# Patient Record
Sex: MALE | Race: WHITE | NOT HISPANIC OR LATINO | Employment: UNEMPLOYED | URBAN - NONMETROPOLITAN AREA
[De-identification: names, ages, dates, MRNs, and addresses within clinical notes are randomized per-mention and may not be internally consistent; named-entity substitution may affect disease eponyms.]

---

## 2017-04-20 ENCOUNTER — LAB REQUISITION (OUTPATIENT)
Dept: LAB | Facility: HOSPITAL | Age: 62
End: 2017-04-20

## 2017-04-20 DIAGNOSIS — M25.561 PAIN IN RIGHT KNEE: ICD-10-CM

## 2017-04-20 LAB
APPEARANCE FLD: ABNORMAL
COLOR FLD: YELLOW
CRYSTALS FLD MICRO: NORMAL
GRAM STN SPEC: NORMAL
RBC # FLD AUTO: 1000 /MM3 (ref 0–200000)
WBC # FLD: 4018 /MM3 (ref 0–1000)

## 2017-04-20 PROCEDURE — 87015 SPECIMEN INFECT AGNT CONCNTJ: CPT | Performed by: EMERGENCY MEDICINE

## 2017-04-20 PROCEDURE — 87205 SMEAR GRAM STAIN: CPT | Performed by: EMERGENCY MEDICINE

## 2017-04-20 PROCEDURE — 89051 BODY FLUID CELL COUNT: CPT | Performed by: EMERGENCY MEDICINE

## 2017-04-20 PROCEDURE — 89060 EXAM SYNOVIAL FLUID CRYSTALS: CPT | Performed by: EMERGENCY MEDICINE

## 2017-04-20 PROCEDURE — 87070 CULTURE OTHR SPECIMN AEROBIC: CPT | Performed by: EMERGENCY MEDICINE

## 2017-04-20 PROCEDURE — 85060 BLOOD SMEAR INTERPRETATION: CPT | Performed by: EMERGENCY MEDICINE

## 2017-04-24 LAB
BACTERIA FLD CULT: NO GROWTH
INTERPRETATION: NORMAL
MONOCYTES NFR FLD: 6 %
NEUTROPHILS NFR FLD MANUAL: 94 %

## 2017-06-06 ENCOUNTER — OFFICE VISIT (OUTPATIENT)
Dept: NEUROSURGERY | Facility: CLINIC | Age: 62
End: 2017-06-06

## 2017-06-06 VITALS — BODY MASS INDEX: 33.6 KG/M2 | WEIGHT: 240 LBS | HEIGHT: 71 IN

## 2017-06-06 DIAGNOSIS — M48.02 FORAMINAL STENOSIS OF CERVICAL REGION: Primary | ICD-10-CM

## 2017-06-06 PROCEDURE — 99243 OFF/OP CNSLTJ NEW/EST LOW 30: CPT | Performed by: NEUROLOGICAL SURGERY

## 2017-06-06 RX ORDER — BUDESONIDE AND FORMOTEROL FUMARATE DIHYDRATE 160; 4.5 UG/1; UG/1
2 AEROSOL RESPIRATORY (INHALATION)
COMMUNITY

## 2017-06-06 RX ORDER — ALBUTEROL SULFATE 90 UG/1
2 AEROSOL, METERED RESPIRATORY (INHALATION) EVERY 4 HOURS PRN
COMMUNITY
End: 2018-05-09

## 2017-06-06 RX ORDER — HYDROCODONE BITARTRATE AND ACETAMINOPHEN 10; 325 MG/1; MG/1
1 TABLET ORAL 3 TIMES DAILY
Refills: 0 | Status: ON HOLD | COMMUNITY
Start: 2017-05-11 | End: 2018-05-17

## 2017-06-06 NOTE — PROGRESS NOTES
Subjective   Patient ID: Luis Crenshaw is a 62 y.o. 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  His problem is primarily weakness in his upper extremities.  He states he lost right triceps strength about 2 years ago but it has partially returned.  He has lost the strength to elevate his left arm in the past year.  He has pain in his neck.  Everything seems to aggravate his problem.  He has been disabled over the past 5 years.    Review of Radiographic Studies:  Cervical MRI scan on 5/8/17 shows a right C6 7 foraminal stenosis, bilateral C5 6 foraminal stenosis and moderate central canal stenosis, and possible left C4 5 foraminal stenosis.    The following portions of the patient's history were reviewed, updated as appropriate and approved: allergies, current medications, past family history, past medical history, past social history, past surgical history, review of systems and problem list.  Review of Systems   Constitutional: Negative for activity change, appetite change, chills, diaphoresis, fatigue, fever and unexpected weight change.   HENT: Negative for congestion, dental problem, drooling, ear discharge, ear pain, facial swelling, hearing loss, mouth sores, nosebleeds, postnasal drip, rhinorrhea, sinus pressure, sneezing, sore throat, tinnitus, trouble swallowing and voice change.    Eyes: Negative for photophobia, pain, discharge, redness, itching and visual disturbance.   Respiratory: Negative for apnea, cough, choking, chest tightness, shortness of breath, wheezing and stridor.    Cardiovascular: Negative for chest pain, palpitations and leg swelling.   Gastrointestinal: Negative for abdominal distention, abdominal pain, anal bleeding, blood in stool, constipation, diarrhea, nausea, rectal pain and vomiting.    Endocrine: Negative for cold intolerance, heat intolerance, polydipsia, polyphagia and polyuria.   Genitourinary: Negative for decreased urine volume, difficulty urinating, dysuria, enuresis, flank pain, frequency, genital sores, hematuria and urgency.   Musculoskeletal: Positive for arthralgias, back pain and neck stiffness. Negative for gait problem, joint swelling, myalgias and neck pain.   Skin: Negative for color change, pallor, rash and wound.   Allergic/Immunologic: Negative for environmental allergies, food allergies and immunocompromised state.   Neurological: Positive for weakness. Negative for dizziness, tremors, seizures, syncope, facial asymmetry, speech difficulty, light-headedness, numbness and headaches.   Hematological: Negative for adenopathy. Does not bruise/bleed easily.   Psychiatric/Behavioral: Negative for agitation, behavioral problems, confusion, decreased concentration, dysphoric mood, hallucinations, self-injury, sleep disturbance and suicidal ideas. The patient is not nervous/anxious and is not hyperactive.        Objective     NEUROLOGICAL EXAMINATION:      MENTAL STATUS:  Alert and oriented.  Speech intact.  Recent and remote memory intact.      CRANIAL NERVES:  Cranial nerve II:  Visual fields are full to confrontation.  Cranial nerves III, IV and VI:  PERRLADC.  Extraocular movements are intact.  Nystagmus is not present.  Cranial nerve V:  Facial sensation is intact to light touch.  Cranial nerve VII:  Muscles of facial expression reveal no asymmetry.  Cranial nerve VIII:  Hearing is intact to finger rub bilaterally.  Cranial nerves IX and X:  Palate elevates symmetrically.  Cranial nerve XI:  Shoulder shrug is intact.  Cranial nerve XII:  Tongue is midline without evidence of atrophy or fasciculation.    MUSCULOSKELETAL:  No cervical muscle spasm.    MOTOR:  Marked deltoid weakness on the left and mild biceps weakness on the left.  Mild residual triceps weakness on the  right.    SENSATION: Intact to touch upper and lower extremities.  Position sense intact.    REFLEXES:  DTR intact right biceps, absent left biceps, absent triceps bilaterally.    Assessment   Bilateral cervical foraminal stenosis C5 6, right C6 7 cervical foraminal stenosis, possible left C4 5 cervical foraminal stenosis.       Plan   Surgical decompression of his a reasonable choice.  We need to better define the symptomatic level on the left side, whether it is C4 5 left are C5 6 left or both.  Therefore cervical myelogram and upper extremity EMG/NCV will be done and I'll see him in follow-up.        Nithin Cortes MD

## 2017-06-09 DIAGNOSIS — M48.02 FORAMINAL STENOSIS OF CERVICAL REGION: Primary | ICD-10-CM

## 2017-06-15 ENCOUNTER — HOSPITAL ENCOUNTER (OUTPATIENT)
Facility: HOSPITAL | Age: 62
Setting detail: HOSPITAL OUTPATIENT SURGERY
End: 2017-06-15
Attending: RADIOLOGY | Admitting: RADIOLOGY

## 2017-06-15 DIAGNOSIS — M48.02 FORAMINAL STENOSIS OF CERVICAL REGION: ICD-10-CM

## 2017-06-19 ENCOUNTER — PREP FOR SURGERY (OUTPATIENT)
Dept: OTHER | Facility: HOSPITAL | Age: 62
End: 2017-06-19

## 2017-06-19 RX ORDER — PHENYLEPHRINE HYDROCHLORIDE 100 MG/ML
1 SOLUTION/ DROPS OPHTHALMIC
Status: CANCELLED | OUTPATIENT
Start: 2017-06-19 | End: 2017-06-19

## 2017-06-19 RX ORDER — PREDNISOLONE ACETATE 10 MG/ML
1 SUSPENSION/ DROPS OPHTHALMIC 4 TIMES DAILY
Status: CANCELLED | OUTPATIENT
Start: 2017-06-19

## 2017-06-19 RX ORDER — CYCLOPENTOLATE HYDROCHLORIDE 20 MG/ML
1 SOLUTION/ DROPS OPHTHALMIC
Status: CANCELLED | OUTPATIENT
Start: 2017-06-19 | End: 2017-06-19

## 2017-07-06 ENCOUNTER — ANESTHESIA (OUTPATIENT)
Dept: PERIOP | Facility: HOSPITAL | Age: 62
End: 2017-07-06

## 2017-07-06 ENCOUNTER — ANESTHESIA EVENT (OUTPATIENT)
Dept: PERIOP | Facility: HOSPITAL | Age: 62
End: 2017-07-06

## 2017-07-06 ENCOUNTER — HOSPITAL ENCOUNTER (OUTPATIENT)
Facility: HOSPITAL | Age: 62
Setting detail: HOSPITAL OUTPATIENT SURGERY
Discharge: HOME | End: 2017-07-06
Attending: OPHTHALMOLOGY | Admitting: OPHTHALMOLOGY

## 2017-07-06 VITALS
HEIGHT: 71 IN | SYSTOLIC BLOOD PRESSURE: 149 MMHG | TEMPERATURE: 98.5 F | OXYGEN SATURATION: 97 % | DIASTOLIC BLOOD PRESSURE: 104 MMHG | WEIGHT: 240 LBS | BODY MASS INDEX: 33.6 KG/M2 | HEART RATE: 64 BPM | RESPIRATION RATE: 18 BRPM

## 2017-07-06 PROCEDURE — 25010000002 EPINEPHRINE PER 0.1 MG: Performed by: OPHTHALMOLOGY

## 2017-07-06 PROCEDURE — V2632 POST CHMBR INTRAOCULAR LENS: HCPCS | Performed by: OPHTHALMOLOGY

## 2017-07-06 PROCEDURE — 25010000002 EPINEPHRINE 1 MG/ML SOLUTION: Performed by: OPHTHALMOLOGY

## 2017-07-06 PROCEDURE — 25010000002 MIDAZOLAM PER 1 MG: Performed by: NURSE ANESTHETIST, CERTIFIED REGISTERED

## 2017-07-06 DEVICE — LENS ACRYSOF IQ 6X13MM SN60WF 20.0: Type: IMPLANTABLE DEVICE | Site: POSTERIOR CHAMBER | Status: FUNCTIONAL

## 2017-07-06 RX ORDER — PREDNISOLONE ACETATE 10 MG/ML
SUSPENSION/ DROPS OPHTHALMIC
Status: DISCONTINUED
Start: 2017-07-06 | End: 2017-07-06 | Stop reason: HOSPADM

## 2017-07-06 RX ORDER — LIDOCAINE HYDROCHLORIDE 40 MG/ML
SOLUTION TOPICAL AS NEEDED
Status: DISCONTINUED | OUTPATIENT
Start: 2017-07-06 | End: 2017-07-06 | Stop reason: HOSPADM

## 2017-07-06 RX ORDER — PREDNISOLONE ACETATE 10 MG/ML
1 SUSPENSION/ DROPS OPHTHALMIC 4 TIMES DAILY
Status: DISCONTINUED | OUTPATIENT
Start: 2017-07-06 | End: 2017-07-06 | Stop reason: HOSPADM

## 2017-07-06 RX ORDER — PHENYLEPHRINE HYDROCHLORIDE 100 MG/ML
1 SOLUTION/ DROPS OPHTHALMIC
Status: COMPLETED | OUTPATIENT
Start: 2017-07-06 | End: 2017-07-06

## 2017-07-06 RX ORDER — EPINEPHRINE 1 MG/ML
INJECTION INTRAMUSCULAR; INTRAVENOUS; SUBCUTANEOUS AS NEEDED
Status: DISCONTINUED | OUTPATIENT
Start: 2017-07-06 | End: 2017-07-06 | Stop reason: HOSPADM

## 2017-07-06 RX ORDER — TETRACAINE HYDROCHLORIDE 5 MG/ML
SOLUTION OPHTHALMIC AS NEEDED
Status: DISCONTINUED | OUTPATIENT
Start: 2017-07-06 | End: 2017-07-06 | Stop reason: HOSPADM

## 2017-07-06 RX ORDER — SODIUM CHLORIDE 0.9 % (FLUSH) 0.9 %
3 SYRINGE (ML) INJECTION AS NEEDED
Status: DISCONTINUED | OUTPATIENT
Start: 2017-07-06 | End: 2017-07-06 | Stop reason: HOSPADM

## 2017-07-06 RX ORDER — CYCLOPENTOLATE HYDROCHLORIDE 20 MG/ML
1 SOLUTION/ DROPS OPHTHALMIC
Status: COMPLETED | OUTPATIENT
Start: 2017-07-06 | End: 2017-07-06

## 2017-07-06 RX ORDER — MIDAZOLAM HYDROCHLORIDE 1 MG/ML
INJECTION INTRAMUSCULAR; INTRAVENOUS AS NEEDED
Status: DISCONTINUED | OUTPATIENT
Start: 2017-07-06 | End: 2017-07-06 | Stop reason: SURG

## 2017-07-06 RX ORDER — PHENYLEPHRINE HYDROCHLORIDE 100 MG/ML
SOLUTION/ DROPS OPHTHALMIC
Status: COMPLETED
Start: 2017-07-06 | End: 2017-07-06

## 2017-07-06 RX ORDER — BALANCED SALT SOLUTION 6.4; .75; .48; .3; 3.9; 1.7 MG/ML; MG/ML; MG/ML; MG/ML; MG/ML; MG/ML
SOLUTION OPHTHALMIC AS NEEDED
Status: DISCONTINUED | OUTPATIENT
Start: 2017-07-06 | End: 2017-07-06 | Stop reason: HOSPADM

## 2017-07-06 RX ORDER — POLYMYXIN B SULFATE AND TRIMETHOPRIM 1; 10000 MG/ML; [USP'U]/ML
SOLUTION OPHTHALMIC AS NEEDED
Status: DISCONTINUED | OUTPATIENT
Start: 2017-07-06 | End: 2017-07-06 | Stop reason: HOSPADM

## 2017-07-06 RX ORDER — CYCLOPENTOLATE HYDROCHLORIDE 20 MG/ML
SOLUTION/ DROPS OPHTHALMIC
Status: COMPLETED
Start: 2017-07-06 | End: 2017-07-06

## 2017-07-06 RX ORDER — SODIUM CHLORIDE, SODIUM LACTATE, POTASSIUM CHLORIDE, CALCIUM CHLORIDE 600; 310; 30; 20 MG/100ML; MG/100ML; MG/100ML; MG/100ML
1000 INJECTION, SOLUTION INTRAVENOUS CONTINUOUS PRN
Status: DISCONTINUED | OUTPATIENT
Start: 2017-07-06 | End: 2017-07-06 | Stop reason: HOSPADM

## 2017-07-06 RX ADMIN — CYCLOPENTOLATE HYDROCHLORIDE 1 DROP: 20 SOLUTION/ DROPS OPHTHALMIC at 07:50

## 2017-07-06 RX ADMIN — CYCLOPENTOLATE HYDROCHLORIDE 1 DROP: 20 SOLUTION/ DROPS OPHTHALMIC at 07:45

## 2017-07-06 RX ADMIN — CYCLOPENTOLATE HYDROCHLORIDE 1 DROP: 20 SOLUTION/ DROPS OPHTHALMIC at 07:40

## 2017-07-06 RX ADMIN — SODIUM CHLORIDE, POTASSIUM CHLORIDE, SODIUM LACTATE AND CALCIUM CHLORIDE 1000 ML: 600; 310; 30; 20 INJECTION, SOLUTION INTRAVENOUS at 07:47

## 2017-07-06 RX ADMIN — PHENYLEPHRINE HYDROCHLORIDE 1 DROP: 100 SOLUTION/ DROPS OPHTHALMIC at 07:50

## 2017-07-06 RX ADMIN — PHENYLEPHRINE HYDROCHLORIDE 1 DROP: 100 SOLUTION/ DROPS OPHTHALMIC at 07:45

## 2017-07-06 RX ADMIN — MIDAZOLAM HYDROCHLORIDE 0.5 MG: 1 INJECTION, SOLUTION INTRAMUSCULAR; INTRAVENOUS at 09:20

## 2017-07-06 RX ADMIN — PHENYLEPHRINE HYDROCHLORIDE 1 DROP: 100 SOLUTION/ DROPS OPHTHALMIC at 07:40

## 2017-07-06 NOTE — ANESTHESIA POSTPROCEDURE EVALUATION
Patient: Luis Crenshaw    Procedure Summary     Date Anesthesia Start Anesthesia Stop Room / Location    07/06/17 0919 0944  [facility]       Procedure Diagnosis Surgeon Provider    CATARACT PHACO EXTRACTION WITH INTRAOCULAR LENS IMPLANT LEFT (Left Eye) No diagnosis on file. MD Cheikh Alegre CRNA          Anesthesia Type: MAC  Last vitals  BP      Temp      Pulse     Resp      SpO2        Post Anesthesia Care and Evaluation    Patient location during evaluation: bedside  Patient participation: complete - patient participated  Level of consciousness: awake and alert  Pain management: satisfactory to patient  Airway patency: patent  Anesthetic complications: No anesthetic complications  PONV Status: controlled  Cardiovascular status: acceptable and stable  Respiratory status: acceptable  Hydration status: acceptable

## 2017-07-06 NOTE — NURSING NOTE
Pt unable to remove wedding band, informed him and his daughter of risk r/t metal on body during procedure, verbalized understanding of risk, ring was taped

## 2017-07-06 NOTE — H&P
[id number]  Luis Crenshaw  male  1955  Anton Campa MD  7/6/2017  PATIENT NAME: Luis Crenshaw    [facility]  PREOPERATIVE PHYSICAL EXAMINATION    Temp:  [98.3 °F (36.8 °C)] 98.3 °F (36.8 °C)  Heart Rate:  [64] 64  Resp:  [19] 19  BP: (166)/(97) 166/97    Past Medical History:   Diagnosis Date   • Arthritis    • Cataract, bilateral    • DDD (degenerative disc disease), lumbar        Past Surgical History:   Procedure Laterality Date   • COLONOSCOPY     • FEMUR FRACTURE SURGERY Right 1976    [place], [place]       Social History     Social History   • Marital status:      Spouse name: N/A   • Number of children: N/A   • Years of education: N/A     Occupational History   • Not on file.     Social History Main Topics   • Smoking status: Current Every Day Smoker     Packs/day: 2.00     Years: 40.00     Types: Cigarettes   • Smokeless tobacco: Never Used   • Alcohol use 14.4 oz/week     24 Cans of beer per week   • Drug use: No   • Sexual activity: Defer     Other Topics Concern   • Not on file     Social History Narrative       History reviewed. No pertinent family history.    Prescriptions Prior to Admission   Medication Sig Dispense Refill Last Dose   • albuterol (PROVENTIL HFA;VENTOLIN HFA) 108 (90 BASE) MCG/ACT inhaler Inhale 2 puffs Every 4 (Four) Hours As Needed for Wheezing.   Past Week at Unknown time   • budesonide-formoterol (SYMBICORT) 160-4.5 MCG/ACT inhaler Inhale 2 puffs 2 (Two) Times a Day.   Past Week at Unknown time   • HYDROcodone-acetaminophen (NORCO)  MG per tablet Take 1 tablet by mouth 3 (Three) Times a Day.  0 7/5/2017 at 1800        Within Normal Limits Abnormal   Mental Status [x]    []     Head, Neck, and Throat [x]   []     Chest/Lungs [x]   []     Cardiovascular [x]   []     Abdomen [x]   []     Extremities [x]   []     Neurologic [x]   []     Other       Diagnosis: Cataract      STATEMENT AS TO REASON OF PLANNED OPERATION:  [x]   H&P revealed no contraindication to planned  surgery. (Check if correct).    [x]   Labs (if ordered) have been reviewed and revealed no contraindications to planned surgery. (Check if correct).    [x]   Patient has been advised to see his local medical doctor/family doctor for follow-up regarding systemic care and/or abnormal labs.  (Check if correct).    Anton Campa MD  7/6/2017

## 2017-07-06 NOTE — OP NOTE
"Patient Name: Luis Crenshaw  Patient Number: [id number]    PRE-OPERATIVE DIAGNOSIS:  Cataract []  OD, [x]  OS  H25.1()    POST-OPERATIVE DIAGNOSIS:  Same    PROCEDURE:     CATARACT PHACO EXTRACTION WITH INTRAOCULAR LENS IMPLANT LEFT (Left)    Surgeon:      Anton Campa MD    Date of Operation:    7/6/2017    ANESTHESIA:   MAC  COMPLICATIONS:    None  SPECIMEN REMOVED:  Cataract  ESTIMATED BLOOD LOSS:  None    After identifying the patient and obtained fully-informed consent, preoperative drops placed as ordered.  Pre-operative \"time out\" performed.  Patient brought into the operating room and positioned.  Cardiac monitoring was instituted.  Anesthetic gtt to operative eye.      After a surgical scrub, the patient was prepped and draped in the standard ophthalmic fashion.  Lid speculum. Paracentesis. Viscoelastic. Keratome tunneled into anterior chamber.  Capsulorrhexis. Hydrodissection.    Phaco machine tested and found to be in good working order.  Two-handed phacoemulsification performed.     I/A to remove residual cortex.  Viscoelastic in capsular bag.  Intraocular lens placed in standard fashion and centered.  I/A to remove viscoelastic.  Wound hydrated, tested, and found to be watertight.      Lid speculum and drapes removed.  Antibiotic gtt. Eye shield.  Patient to recovery area.  Verbal and written discharge instructions given.  Follow-up appointment given.    Anton Campa MD      Immediate Post-Op Note  Date: 7/6/2017 9:45 AM      "

## 2017-07-06 NOTE — ANESTHESIA PREPROCEDURE EVALUATION
Anesthesia Evaluation     Patient summary reviewed and Nursing notes reviewed   no history of anesthetic complications:  NPO Solid Status: > 8 hours  NPO Liquid Status: > 8 hours     Airway   Mallampati: II  TM distance: >3 FB  Neck ROM: full  no difficulty expected  Dental - normal exam     Pulmonary - normal exam    breath sounds clear to auscultation  (+) COPD mild,   Cardiovascular - negative cardio ROS and normal exam  Exercise tolerance: good (4-7 METS)    Rhythm: regular  Rate: normal        Neuro/Psych- negative ROS  GI/Hepatic/Renal/Endo - negative ROS     Musculoskeletal     Abdominal    Substance History - negative use     OB/GYN negative ob/gyn ROS         Other   (+) arthritis                                     Anesthesia Plan    ASA 3     MAC     Anesthetic plan and risks discussed with patient.

## 2017-07-06 NOTE — PLAN OF CARE
Problem: Cataract (Adult)  Goal: Signs and Symptoms of Listed Potential Problems Will be Absent or Manageable (Cataract)  Outcome: Ongoing (interventions implemented as appropriate)    07/06/17 8799   Cataract   Problems Assessed (Cataract) all   Problems Present (Cataract) none

## 2017-07-06 NOTE — DISCHARGE INSTRUCTIONS
To assist you in voiding:  Drink plenty of fluids  Listen to running water while attempting to void.    If you are unable to urinate and you have an uncomfortable urge to void or it has been   6 hours since you were discharged, return to the Emergency Room    FOLLOW THE POST OP INSTRUCTIONS AND APPOINTMENT TIME FROM DR CARTAGENA OFFICE INCLUDED IN THIS PACKET.

## 2017-07-21 ENCOUNTER — HOSPITAL ENCOUNTER (OUTPATIENT)
Facility: HOSPITAL | Age: 62
Setting detail: HOSPITAL OUTPATIENT SURGERY
End: 2017-07-21
Attending: OPHTHALMOLOGY | Admitting: OPHTHALMOLOGY

## 2017-07-24 VITALS — WEIGHT: 240 LBS | BODY MASS INDEX: 33.6 KG/M2 | HEIGHT: 71 IN

## 2017-07-25 ENCOUNTER — PREP FOR SURGERY (OUTPATIENT)
Dept: OTHER | Facility: HOSPITAL | Age: 62
End: 2017-07-25

## 2017-07-25 DIAGNOSIS — H26.9 CATARACT, RIGHT EYE: Primary | ICD-10-CM

## 2017-07-25 RX ORDER — PREDNISOLONE ACETATE 10 MG/ML
1 SUSPENSION/ DROPS OPHTHALMIC 4 TIMES DAILY
Status: CANCELLED | OUTPATIENT
Start: 2017-07-25

## 2017-07-25 RX ORDER — PHENYLEPHRINE HYDROCHLORIDE 100 MG/ML
1 SOLUTION/ DROPS OPHTHALMIC
Status: CANCELLED | OUTPATIENT
Start: 2017-07-25 | End: 2017-07-25

## 2017-07-25 RX ORDER — POLYMYXIN B SULFATE AND TRIMETHOPRIM 1; 10000 MG/ML; [USP'U]/ML
1 SOLUTION OPHTHALMIC ONCE
Status: CANCELLED | OUTPATIENT
Start: 2017-07-25 | End: 2017-07-25

## 2017-07-25 RX ORDER — TETRACAINE HYDROCHLORIDE 5 MG/ML
2 SOLUTION OPHTHALMIC AS NEEDED
Status: CANCELLED | OUTPATIENT
Start: 2017-07-25

## 2017-07-25 RX ORDER — CYCLOPENTOLATE HYDROCHLORIDE 20 MG/ML
1 SOLUTION/ DROPS OPHTHALMIC
Status: CANCELLED | OUTPATIENT
Start: 2017-07-25 | End: 2017-07-25

## 2017-08-30 ENCOUNTER — HOSPITAL ENCOUNTER (OUTPATIENT)
Facility: HOSPITAL | Age: 62
Setting detail: HOSPITAL OUTPATIENT SURGERY
End: 2017-08-30
Attending: OPHTHALMOLOGY | Admitting: OPHTHALMOLOGY

## 2017-10-02 ENCOUNTER — PREP FOR SURGERY (OUTPATIENT)
Dept: OTHER | Facility: HOSPITAL | Age: 62
End: 2017-10-02

## 2017-10-02 DIAGNOSIS — H26.9 CATARACT, RIGHT: Primary | ICD-10-CM

## 2017-10-02 RX ORDER — LIDOCAINE HYDROCHLORIDE 40 MG/ML
2 INJECTION, SOLUTION RETROBULBAR; TOPICAL
Status: CANCELLED | OUTPATIENT
Start: 2017-10-02

## 2017-10-02 RX ORDER — TETRACAINE HYDROCHLORIDE 5 MG/ML
2 SOLUTION OPHTHALMIC AS NEEDED
Status: CANCELLED | OUTPATIENT
Start: 2017-10-02

## 2017-10-02 RX ORDER — MOXIFLOXACIN 5 MG/ML
1 SOLUTION/ DROPS OPHTHALMIC
Status: CANCELLED | OUTPATIENT
Start: 2017-10-02 | End: 2017-10-02

## 2017-10-02 RX ORDER — CYCLOPENTOLATE HYDROCHLORIDE 20 MG/ML
1 SOLUTION/ DROPS OPHTHALMIC
Status: CANCELLED | OUTPATIENT
Start: 2017-10-02 | End: 2017-10-02

## 2017-10-02 RX ORDER — PREDNISOLONE ACETATE 10 MG/ML
1 SUSPENSION/ DROPS OPHTHALMIC 4 TIMES DAILY
Status: CANCELLED | OUTPATIENT
Start: 2017-10-02

## 2017-10-02 RX ORDER — PHENYLEPHRINE HYDROCHLORIDE 100 MG/ML
1 SOLUTION/ DROPS OPHTHALMIC
Status: CANCELLED | OUTPATIENT
Start: 2017-10-02 | End: 2017-10-02

## 2017-10-05 ENCOUNTER — ANESTHESIA EVENT (OUTPATIENT)
Dept: PERIOP | Facility: HOSPITAL | Age: 62
End: 2017-10-05

## 2017-10-05 ENCOUNTER — ANESTHESIA (OUTPATIENT)
Dept: PERIOP | Facility: HOSPITAL | Age: 62
End: 2017-10-05

## 2017-10-05 ENCOUNTER — HOSPITAL ENCOUNTER (OUTPATIENT)
Facility: HOSPITAL | Age: 62
Setting detail: HOSPITAL OUTPATIENT SURGERY
Discharge: HOME | End: 2017-10-05
Attending: OPHTHALMOLOGY | Admitting: OPHTHALMOLOGY

## 2017-10-05 VITALS
BODY MASS INDEX: 33.6 KG/M2 | DIASTOLIC BLOOD PRESSURE: 96 MMHG | HEIGHT: 71 IN | TEMPERATURE: 99.3 F | OXYGEN SATURATION: 96 % | RESPIRATION RATE: 18 BRPM | SYSTOLIC BLOOD PRESSURE: 162 MMHG | WEIGHT: 240 LBS | HEART RATE: 72 BPM

## 2017-10-05 DIAGNOSIS — H26.9 CATARACT, RIGHT: ICD-10-CM

## 2017-10-05 PROCEDURE — 25010000002 MIDAZOLAM PER 1 MG: Performed by: NURSE ANESTHETIST, CERTIFIED REGISTERED

## 2017-10-05 PROCEDURE — 25010000002 EPINEPHRINE 1 MG/ML SOLUTION: Performed by: OPHTHALMOLOGY

## 2017-10-05 PROCEDURE — 25010000002 EPINEPHRINE PER 0.1 MG: Performed by: OPHTHALMOLOGY

## 2017-10-05 PROCEDURE — V2632 POST CHMBR INTRAOCULAR LENS: HCPCS | Performed by: OPHTHALMOLOGY

## 2017-10-05 DEVICE — LENS ACRYSOF IQ 6X13MM SN60WF 20.0: Type: IMPLANTABLE DEVICE | Site: POSTERIOR CHAMBER | Status: FUNCTIONAL

## 2017-10-05 RX ORDER — SODIUM CHLORIDE 0.9 % (FLUSH) 0.9 %
3 SYRINGE (ML) INJECTION AS NEEDED
Status: DISCONTINUED | OUTPATIENT
Start: 2017-10-05 | End: 2017-10-05 | Stop reason: HOSPADM

## 2017-10-05 RX ORDER — PREDNISOLONE ACETATE 10 MG/ML
SUSPENSION/ DROPS OPHTHALMIC
Status: DISCONTINUED
Start: 2017-10-05 | End: 2017-10-05 | Stop reason: HOSPADM

## 2017-10-05 RX ORDER — EPINEPHRINE 1 MG/ML
INJECTION INTRAMUSCULAR; INTRAVENOUS; SUBCUTANEOUS
Status: DISCONTINUED
Start: 2017-10-05 | End: 2017-10-05 | Stop reason: HOSPADM

## 2017-10-05 RX ORDER — MANNITOL 20 G/100ML
INJECTION, SOLUTION INTRAVENOUS
Status: DISCONTINUED
Start: 2017-10-05 | End: 2017-10-05 | Stop reason: WASHOUT

## 2017-10-05 RX ORDER — PHENYLEPHRINE HYDROCHLORIDE 100 MG/ML
1 SOLUTION/ DROPS OPHTHALMIC
Status: COMPLETED | OUTPATIENT
Start: 2017-10-05 | End: 2017-10-05

## 2017-10-05 RX ORDER — LIDOCAINE HYDROCHLORIDE 40 MG/ML
2 INJECTION, SOLUTION RETROBULBAR; TOPICAL
Status: DISCONTINUED | OUTPATIENT
Start: 2017-10-05 | End: 2017-10-05 | Stop reason: HOSPADM

## 2017-10-05 RX ORDER — LIDOCAINE HYDROCHLORIDE 40 MG/ML
SOLUTION TOPICAL AS NEEDED
Status: DISCONTINUED | OUTPATIENT
Start: 2017-10-05 | End: 2017-10-05 | Stop reason: HOSPADM

## 2017-10-05 RX ORDER — EPINEPHRINE 1 MG/ML
INJECTION INTRAMUSCULAR; INTRAVENOUS; SUBCUTANEOUS AS NEEDED
Status: DISCONTINUED | OUTPATIENT
Start: 2017-10-05 | End: 2017-10-05 | Stop reason: HOSPADM

## 2017-10-05 RX ORDER — MOXIFLOXACIN 5 MG/ML
1 SOLUTION/ DROPS OPHTHALMIC
Status: DISPENSED | OUTPATIENT
Start: 2017-10-05 | End: 2017-10-05

## 2017-10-05 RX ORDER — PHENYLEPHRINE HYDROCHLORIDE 100 MG/ML
SOLUTION/ DROPS OPHTHALMIC
Status: COMPLETED
Start: 2017-10-05 | End: 2017-10-05

## 2017-10-05 RX ORDER — SODIUM CHLORIDE, SODIUM LACTATE, POTASSIUM CHLORIDE, CALCIUM CHLORIDE 600; 310; 30; 20 MG/100ML; MG/100ML; MG/100ML; MG/100ML
1000 INJECTION, SOLUTION INTRAVENOUS CONTINUOUS PRN
Status: DISCONTINUED | OUTPATIENT
Start: 2017-10-05 | End: 2017-10-05 | Stop reason: HOSPADM

## 2017-10-05 RX ORDER — BALANCED SALT SOLUTION 6.4; .75; .48; .3; 3.9; 1.7 MG/ML; MG/ML; MG/ML; MG/ML; MG/ML; MG/ML
SOLUTION OPHTHALMIC
Status: DISCONTINUED
Start: 2017-10-05 | End: 2017-10-05 | Stop reason: HOSPADM

## 2017-10-05 RX ORDER — TETRACAINE HYDROCHLORIDE 5 MG/ML
2 SOLUTION OPHTHALMIC AS NEEDED
Status: DISCONTINUED | OUTPATIENT
Start: 2017-10-05 | End: 2017-10-05 | Stop reason: HOSPADM

## 2017-10-05 RX ORDER — TETRACAINE HYDROCHLORIDE 5 MG/ML
SOLUTION OPHTHALMIC
Status: DISCONTINUED
Start: 2017-10-05 | End: 2017-10-05 | Stop reason: HOSPADM

## 2017-10-05 RX ORDER — BALANCED SALT SOLUTION 6.4; .75; .48; .3; 3.9; 1.7 MG/ML; MG/ML; MG/ML; MG/ML; MG/ML; MG/ML
SOLUTION OPHTHALMIC AS NEEDED
Status: DISCONTINUED | OUTPATIENT
Start: 2017-10-05 | End: 2017-10-05 | Stop reason: HOSPADM

## 2017-10-05 RX ORDER — LIDOCAINE HYDROCHLORIDE 40 MG/ML
INJECTION, SOLUTION RETROBULBAR; TOPICAL
Status: DISCONTINUED
Start: 2017-10-05 | End: 2017-10-05 | Stop reason: HOSPADM

## 2017-10-05 RX ORDER — CYCLOPENTOLATE HYDROCHLORIDE 20 MG/ML
SOLUTION/ DROPS OPHTHALMIC
Status: COMPLETED
Start: 2017-10-05 | End: 2017-10-05

## 2017-10-05 RX ORDER — POLYMYXIN B SULFATE AND TRIMETHOPRIM 1; 10000 MG/ML; [USP'U]/ML
SOLUTION OPHTHALMIC AS NEEDED
Status: DISCONTINUED | OUTPATIENT
Start: 2017-10-05 | End: 2017-10-05 | Stop reason: HOSPADM

## 2017-10-05 RX ORDER — POLYMYXIN B SULFATE AND TRIMETHOPRIM 1; 10000 MG/ML; [USP'U]/ML
SOLUTION OPHTHALMIC
Status: DISCONTINUED
Start: 2017-10-05 | End: 2017-10-05 | Stop reason: HOSPADM

## 2017-10-05 RX ORDER — LABETALOL HYDROCHLORIDE 5 MG/ML
INJECTION, SOLUTION INTRAVENOUS AS NEEDED
Status: DISCONTINUED | OUTPATIENT
Start: 2017-10-05 | End: 2017-10-05 | Stop reason: SURG

## 2017-10-05 RX ORDER — MIDAZOLAM HYDROCHLORIDE 1 MG/ML
INJECTION INTRAMUSCULAR; INTRAVENOUS AS NEEDED
Status: DISCONTINUED | OUTPATIENT
Start: 2017-10-05 | End: 2017-10-05 | Stop reason: SURG

## 2017-10-05 RX ORDER — PREDNISOLONE ACETATE 10 MG/ML
1 SUSPENSION/ DROPS OPHTHALMIC 4 TIMES DAILY
Status: DISCONTINUED | OUTPATIENT
Start: 2017-10-05 | End: 2017-10-05 | Stop reason: HOSPADM

## 2017-10-05 RX ORDER — CYCLOPENTOLATE HYDROCHLORIDE 20 MG/ML
1 SOLUTION/ DROPS OPHTHALMIC
Status: COMPLETED | OUTPATIENT
Start: 2017-10-05 | End: 2017-10-05

## 2017-10-05 RX ADMIN — CYCLOPENTOLATE HYDROCHLORIDE 1 DROP: 20 SOLUTION/ DROPS OPHTHALMIC at 06:48

## 2017-10-05 RX ADMIN — MIDAZOLAM HYDROCHLORIDE 0.5 MG: 1 INJECTION, SOLUTION INTRAMUSCULAR; INTRAVENOUS at 08:13

## 2017-10-05 RX ADMIN — CYCLOPENTOLATE HYDROCHLORIDE 1 DROP: 20 SOLUTION/ DROPS OPHTHALMIC at 06:43

## 2017-10-05 RX ADMIN — PHENYLEPHRINE HYDROCHLORIDE 1 DROP: 100 SOLUTION/ DROPS OPHTHALMIC at 06:48

## 2017-10-05 RX ADMIN — PHENYLEPHRINE HYDROCHLORIDE 1 DROP: 100 SOLUTION/ DROPS OPHTHALMIC at 06:43

## 2017-10-05 RX ADMIN — PHENYLEPHRINE HYDROCHLORIDE 1 DROP: 100 SOLUTION/ DROPS OPHTHALMIC at 06:53

## 2017-10-05 RX ADMIN — LABETALOL HYDROCHLORIDE 5 MG: 5 INJECTION, SOLUTION INTRAVENOUS at 08:23

## 2017-10-05 RX ADMIN — CYCLOPENTOLATE HYDROCHLORIDE 1 DROP: 20 SOLUTION/ DROPS OPHTHALMIC at 06:53

## 2017-10-05 RX ADMIN — SODIUM CHLORIDE, POTASSIUM CHLORIDE, SODIUM LACTATE AND CALCIUM CHLORIDE 1000 ML: 600; 310; 30; 20 INJECTION, SOLUTION INTRAVENOUS at 06:55

## 2017-10-05 RX ADMIN — MIDAZOLAM HYDROCHLORIDE 0.5 MG: 1 INJECTION, SOLUTION INTRAMUSCULAR; INTRAVENOUS at 08:19

## 2017-10-05 RX ADMIN — LABETALOL HYDROCHLORIDE 10 MG: 5 INJECTION, SOLUTION INTRAVENOUS at 08:19

## 2017-10-05 RX ADMIN — LABETALOL HYDROCHLORIDE 5 MG: 5 INJECTION, SOLUTION INTRAVENOUS at 08:15

## 2017-10-05 NOTE — PLAN OF CARE
Problem: Cataract (Adult)  Goal: Signs and Symptoms of Listed Potential Problems Will be Absent or Manageable (Cataract)  Outcome: Ongoing (interventions implemented as appropriate)    10/05/17 0638   Cataract   Problems Assessed (Cataract) all

## 2017-10-05 NOTE — OP NOTE
"Patient Name: Luis Crenshaw  Patient Number: [id number]    PRE-OPERATIVE DIAGNOSIS:  Cataract [x]  OD, []  OS  H25.1()    POST-OPERATIVE DIAGNOSIS:  Same    PROCEDURE:     CATARACT PHACO EXTRACTION WITH INTRAOCULAR LENS IMPLANT RIGHT (Right)    Surgeon:      Anton Campa MD    Date of Operation:    10/5/2017    ANESTHESIA:   MAC  COMPLICATIONS:    None  SPECIMEN REMOVED:  Cataract  ESTIMATED BLOOD LOSS:  None    After identifying the patient and obtained fully-informed consent, preoperative drops placed as ordered.  Pre-operative \"time out\" performed.  Patient brought into the operating room and positioned.  Cardiac monitoring was instituted.  Anesthetic gtt to operative eye.      After a surgical scrub, the patient was prepped and draped in the standard ophthalmic fashion.  Lid speculum. Paracentesis. Viscoelastic. Keratome tunneled into anterior chamber.  Capsulorrhexis. Hydrodissection.    Phaco machine tested and found to be in good working order.  Two-handed phacoemulsification performed.     I/A to remove residual cortex.  Viscoelastic in capsular bag.  Intraocular lens placed in standard fashion and centered.  I/A to remove viscoelastic.  Wound hydrated, tested, and found to be watertight.      Lid speculum and drapes removed.  Antibiotic gtt. Eye shield.  Patient to recovery area.  Verbal and written discharge instructions given.  Follow-up appointment given.    Anton Campa MD      Immediate Post-Op Note  Date: 10/5/2017 8:33 AM      "

## 2017-10-05 NOTE — H&P
[id number]  Luis Crenshaw  male  1955  Anton Campa MD  10/5/2017  PATIENT NAME: Luis Crenshaw    [facility]  PREOPERATIVE PHYSICAL EXAMINATION    Temp:  [98.6 °F (37 °C)] 98.6 °F (37 °C)  Heart Rate:  [67] 67  Resp:  [18] 18  BP: (156)/(96) 156/96    Past Medical History:   Diagnosis Date   • Arthritis    • Cataract, bilateral    • DDD (degenerative disc disease), lumbar        Past Surgical History:   Procedure Laterality Date   • CATARACT EXTRACTION W/ INTRAOCULAR LENS IMPLANT Left 7/6/2017    Procedure: CATARACT PHACO EXTRACTION WITH INTRAOCULAR LENS IMPLANT LEFT;  Surgeon: Anton Campa MD;  Location: [facility];  Service:    • COLONOSCOPY     • FEMUR FRACTURE SURGERY Right 1976    [place], [place]       Social History     Social History   • Marital status:      Spouse name: N/A   • Number of children: N/A   • Years of education: N/A     Occupational History   • Not on file.     Social History Main Topics   • Smoking status: Current Every Day Smoker     Packs/day: 2.00     Years: 40.00     Types: Cigarettes   • Smokeless tobacco: Never Used   • Alcohol use 14.4 oz/week     24 Cans of beer per week   • Drug use: No   • Sexual activity: Defer     Other Topics Concern   • Not on file     Social History Narrative       History reviewed. No pertinent family history.    Prescriptions Prior to Admission   Medication Sig Dispense Refill Last Dose   • albuterol (PROVENTIL HFA;VENTOLIN HFA) 108 (90 BASE) MCG/ACT inhaler Inhale 2 puffs Every 4 (Four) Hours As Needed for Wheezing.   Past Month at Unknown time   • budesonide-formoterol (SYMBICORT) 160-4.5 MCG/ACT inhaler Inhale 2 puffs 2 (Two) Times a Day.   Past Month at Unknown time   • HYDROcodone-acetaminophen (NORCO)  MG per tablet Take 1 tablet by mouth 3 (Three) Times a Day.  0 10/3/2017        Within Normal Limits Abnormal   Mental Status [x]    []     Head, Neck, and Throat [x]   []     Chest/Lungs [x]   []     Cardiovascular [x]   []     Abdomen  [x]   []     Extremities [x]   []     Neurologic [x]   []     Other       Diagnosis: Cataract      STATEMENT AS TO REASON OF PLANNED OPERATION:  [x]   H&P revealed no contraindication to planned surgery. (Check if correct).    [x]   Labs (if ordered) have been reviewed and revealed no contraindications to planned surgery. (Check if correct).    [x]   Patient has been advised to see his local medical doctor/family doctor for follow-up regarding systemic care and/or abnormal labs.  (Check if correct).    Anton Campa MD  10/5/2017

## 2017-10-05 NOTE — ANESTHESIA PREPROCEDURE EVALUATION
Anesthesia Evaluation     Patient summary reviewed and Nursing notes reviewed   no history of anesthetic complications:  NPO Solid Status: > 8 hours  NPO Liquid Status: > 8 hours     Airway   Mallampati: II  TM distance: >3 FB  Neck ROM: full  no difficulty expected  Dental - normal exam     Pulmonary - normal exam    breath sounds clear to auscultation  (+) COPD mild,   Cardiovascular - negative cardio ROS and normal exam  Exercise tolerance: good (4-7 METS)    Rhythm: regular  Rate: normal        Neuro/Psych- negative ROS  GI/Hepatic/Renal/Endo    (+) obesity,      Musculoskeletal     (+) arthralgias, back pain, chronic pain,   Abdominal    Substance History - negative use     OB/GYN negative ob/gyn ROS         Other   (+) arthritis                                       Anesthesia Plan    ASA 3     MAC   (Risks and benefits discussed including risk of aspiration, recall and dental damage. All patient questions answered. Will continue with POC.)  Anesthetic plan and risks discussed with patient.

## 2017-10-05 NOTE — ANESTHESIA POSTPROCEDURE EVALUATION
Patient: uLis Crenshaw    Procedure Summary     Date Anesthesia Start Anesthesia Stop Room / Location    10/05/17 0812   [facility]       Procedure Diagnosis Surgeon Provider    CATARACT PHACO EXTRACTION WITH INTRAOCULAR LENS IMPLANT RIGHT (Right Eye) No diagnosis on file. MD Lenard Alegre CRNA          Anesthesia Type: MAC  Last vitals  BP        Temp        Pulse       Resp        SpO2          Post Anesthesia Care and Evaluation    Patient location during evaluation: PHASE II  Patient participation: complete - patient participated  Level of consciousness: awake  Pain score: 0  Pain management: adequate  Airway patency: patent  Anesthetic complications: No anesthetic complications  PONV Status: none  Cardiovascular status: acceptable  Respiratory status: acceptable  Hydration status: acceptable    Comments: vsss resp spont, reflexes intact, responsive, report given to pacu nurse

## 2018-02-06 ENCOUNTER — OFFICE VISIT (OUTPATIENT)
Dept: NEUROSURGERY | Facility: CLINIC | Age: 63
End: 2018-02-06

## 2018-02-06 DIAGNOSIS — M48.02 FORAMINAL STENOSIS OF CERVICAL REGION: Primary | ICD-10-CM

## 2018-02-06 PROCEDURE — 99213 OFFICE O/P EST LOW 20 MIN: CPT | Performed by: NEUROLOGICAL SURGERY

## 2018-02-06 NOTE — PROGRESS NOTES
Yarelis Crenshaw is a 63 y.o. male who presents for follow up of  upper extremity weakness.     The patient is a 63-year-old man who was seen last June with symptoms of weakness in his upper extremities, right greater than left.  He lost triceps strength about 2 years previously and it partially returned.  He has been losing strength in his left arm with difficulty elevating it at the shoulder.  He has been disabled for 5 years.  He has neck pain without specific unilateral arm pain.    Cervical MRI scan on 5/8/17 showed a right C6-7 foraminal stenosis, bilateral C5-6 foraminal stenosis with moderate central canal stenosis, and possible left C4-5 foraminal stenosis to explain the left sided shoulder elevation weakness.    A cervical myelogram was scheduled when I saw him 8 months ago, but he was unable to keep the appointment.  He's ready to have it done now.    The following portions of the patient's history were reviewed, updated as appropriate and approved: allergies, current medications, past family history, past medical history, past social history, past surgical history, review of systems and problem list.     Review of Systems   Constitutional: Negative for activity change, appetite change, chills, diaphoresis, fatigue, fever and unexpected weight change.   HENT: Negative for congestion, dental problem, drooling, ear discharge, ear pain, facial swelling, hearing loss, mouth sores, nosebleeds, postnasal drip, rhinorrhea, sinus pressure, sneezing, sore throat, tinnitus, trouble swallowing and voice change.    Eyes: Negative for photophobia, pain, discharge, redness, itching and visual disturbance.   Respiratory: Negative for apnea, cough, choking, chest tightness, shortness of breath, wheezing and stridor.    Cardiovascular: Negative for chest pain, palpitations and leg swelling.   Gastrointestinal: Negative for abdominal distention, abdominal pain, anal bleeding, blood in stool, constipation,  diarrhea, nausea, rectal pain and vomiting.   Endocrine: Negative for cold intolerance, heat intolerance, polydipsia, polyphagia and polyuria.   Genitourinary: Negative for decreased urine volume, difficulty urinating, dysuria, enuresis, flank pain, frequency, genital sores, hematuria and urgency.   Musculoskeletal: Positive for arthralgias, back pain and neck stiffness. Negative for gait problem, joint swelling, myalgias and neck pain.   Skin: Negative for color change, pallor, rash and wound.   Allergic/Immunologic: Negative for environmental allergies, food allergies and immunocompromised state.   Neurological: Positive for weakness. Negative for dizziness, tremors, seizures, syncope, facial asymmetry, speech difficulty, light-headedness, numbness and headaches.   Hematological: Negative for adenopathy. Does not bruise/bleed easily.   Psychiatric/Behavioral: Negative for agitation, behavioral problems, confusion, decreased concentration, dysphoric mood, hallucinations, self-injury, sleep disturbance and suicidal ideas. The patient is not nervous/anxious and is not hyperactive.        Objective    NEUROLOGICAL EXAMINATION:    Left deltoid weakness marked in degree and mild biceps weakness on the left.  Mild residual right triceps weakness.  Lower extremities intact. Right biceps DTR intact, left biceps reflex absent, absent triceps reflex bilaterally.    Assessment   Left deltoid weakness, suspected left C5 radiculopathy secondary to left C4-5 foraminal stenosis, right triceps weakness probably due to right C6-7 foraminal stenosis, stenosis central and bilateral foraminal C5-6.       Plan   Cervical myelogram to determine the levels of significant nerve root compression and decide if surgical decompression is necessary and which levels should be decompressed if surgery is deemed necessary.       Nithin Cortes MD

## 2018-03-05 ENCOUNTER — APPOINTMENT (OUTPATIENT)
Dept: CT IMAGING | Facility: HOSPITAL | Age: 63
End: 2018-03-05

## 2018-03-05 ENCOUNTER — HOSPITAL ENCOUNTER (OUTPATIENT)
Facility: HOSPITAL | Age: 63
Discharge: HOME | End: 2018-03-05
Attending: RADIOLOGY | Admitting: RADIOLOGY

## 2018-03-05 VITALS
HEART RATE: 80 BPM | HEIGHT: 71 IN | DIASTOLIC BLOOD PRESSURE: 107 MMHG | WEIGHT: 230.6 LBS | BODY MASS INDEX: 32.28 KG/M2 | OXYGEN SATURATION: 95 % | SYSTOLIC BLOOD PRESSURE: 164 MMHG | TEMPERATURE: 98.2 F

## 2018-03-05 DIAGNOSIS — M48.02 FORAMINAL STENOSIS OF CERVICAL REGION: ICD-10-CM

## 2018-03-05 PROCEDURE — 62302 MYELOGRAPHY LUMBAR INJECTION: CPT | Performed by: RADIOLOGY

## 2018-03-05 PROCEDURE — 72126 CT NECK SPINE W/DYE: CPT

## 2018-03-05 PROCEDURE — 0 IOPAMIDOL 61 % SOLUTION: Performed by: RADIOLOGY

## 2018-03-05 RX ORDER — LIDOCAINE HYDROCHLORIDE 10 MG/ML
INJECTION, SOLUTION EPIDURAL; INFILTRATION; INTRACAUDAL; PERINEURAL AS NEEDED
Status: DISCONTINUED | OUTPATIENT
Start: 2018-03-05 | End: 2018-03-05 | Stop reason: HOSPADM

## 2018-03-05 RX ORDER — DIAZEPAM 10 MG/1
10 TABLET ORAL AS NEEDED
COMMUNITY
End: 2018-05-09

## 2018-03-05 NOTE — PLAN OF CARE
Problem: Myelogram (Adult)  Goal: Signs and Symptoms of Listed Potential Problems Will be Absent or Manageable (Myelogram)  Outcome: Ongoing (interventions implemented as appropriate)   03/05/18 0810   Myelogram   Problems Assessed (Myelogram) pain   Problems Present (Myelogram) pain

## 2018-03-05 NOTE — PLAN OF CARE
Problem: Patient Care Overview (Adult)  Goal: Plan of Care Review  Outcome: Outcome(s) achieved Date Met: 03/05/18 03/05/18 1050   Coping/Psychosocial Response Interventions   Plan Of Care Reviewed With patient   Patient Care Overview   Progress no change   Outcome Evaluation   Outcome Summary/Follow up Plan Patient's site stable at time of discharge. The patient is being discharged home with family.      Goal: Adult Individualization and Mutuality  Outcome: Outcome(s) achieved Date Met: 03/05/18    Goal: Discharge Needs Assessment  Outcome: Outcome(s) achieved Date Met: 03/05/18      Problem: Myelogram (Adult)  Goal: Signs and Symptoms of Listed Potential Problems Will be Absent or Manageable (Myelogram)  Outcome: Outcome(s) achieved Date Met: 03/05/18

## 2018-03-05 NOTE — PLAN OF CARE
Problem: Patient Care Overview (Adult)  Goal: Plan of Care Review  Outcome: Ongoing (interventions implemented as appropriate)   03/05/18 0810   Coping/Psychosocial Response Interventions   Plan Of Care Reviewed With patient   Patient Care Overview   Progress no change   Outcome Evaluation   Outcome Summary/Follow up Plan awaiting cervical myelogram

## 2018-03-20 ENCOUNTER — OFFICE VISIT (OUTPATIENT)
Dept: NEUROSURGERY | Facility: CLINIC | Age: 63
End: 2018-03-20

## 2018-03-20 VITALS — WEIGHT: 230 LBS | BODY MASS INDEX: 32.2 KG/M2 | HEIGHT: 71 IN

## 2018-03-20 DIAGNOSIS — M48.02 FORAMINAL STENOSIS OF CERVICAL REGION: Primary | ICD-10-CM

## 2018-03-20 PROCEDURE — 99213 OFFICE O/P EST LOW 20 MIN: CPT | Performed by: NEUROLOGICAL SURGERY

## 2018-03-20 NOTE — PROGRESS NOTES
Yarelis Crenshaw is a 63 y.o. male who presents for follow up of  left deltoid weakness.     The patient had a cervical myelogram which shows severe foraminal stenosis with a possible disc herniation at the entry into the foramen at C4-5 on the left is undoubtedly responsible for the left deltoid weakness.  There is significant osteophyte formation at the C3 4 foramen on the left, but this would not affect the deltoid muscle function.  There is a moderate stenosis of the foramen bilaterally at C5-6, and a foraminal stenosis on the right at C6-7.    His current problem is progressing weakness of the left deltoid.  He has atrophy of his right triceps that happened 2 years ago and is a result of the foraminal stenosis with her without chronic disc herniation at C6-7 on the right.    The following portions of the patient's history were reviewed, updated as appropriate and approved: allergies, current medications, past family history, past medical history, past social history, past surgical history, review of systems and problem list.     Review of Systems   Constitutional: Negative for activity change, appetite change, chills, diaphoresis, fatigue, fever and unexpected weight change.   HENT: Negative for congestion, dental problem, drooling, ear discharge, ear pain, facial swelling, hearing loss, mouth sores, nosebleeds, postnasal drip, rhinorrhea, sinus pressure, sneezing, sore throat, tinnitus, trouble swallowing and voice change.    Eyes: Negative for photophobia, pain, discharge, redness, itching and visual disturbance.   Respiratory: Negative for apnea, cough, choking, chest tightness, shortness of breath, wheezing and stridor.    Cardiovascular: Negative for chest pain, palpitations and leg swelling.   Gastrointestinal: Negative for abdominal distention, abdominal pain, anal bleeding, blood in stool, constipation, diarrhea, nausea, rectal pain and vomiting.   Endocrine: Negative for cold intolerance,  heat intolerance, polydipsia, polyphagia and polyuria.   Genitourinary: Negative for decreased urine volume, difficulty urinating, dysuria, enuresis, flank pain, frequency, genital sores, hematuria and urgency.   Musculoskeletal: Positive for arthralgias, back pain and neck stiffness. Negative for gait problem, joint swelling, myalgias and neck pain.   Skin: Negative for color change, pallor, rash and wound.   Allergic/Immunologic: Negative for environmental allergies, food allergies and immunocompromised state.   Neurological: Positive for weakness. Negative for dizziness, tremors, seizures, syncope, facial asymmetry, speech difficulty, light-headedness, numbness and headaches.   Hematological: Negative for adenopathy. Does not bruise/bleed easily.   Psychiatric/Behavioral: Negative for agitation, behavioral problems, confusion, decreased concentration, dysphoric mood, hallucinations, self-injury, sleep disturbance and suicidal ideas. The patient is not nervous/anxious and is not hyperactive.        Objective    NEUROLOGICAL EXAMINATION:    Weakness of the left deltoid sufficient in degree to allow him only about 30° of elevation and the remaining elevation has to be done with the other arm raising his left arm.  Atrophy of the right triceps.    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  Chronic right C7 radiculopathy with triceps atrophy on the right.       Plan   Anterior cervical discectomy and fusion at C4-5.  This could be approached from anterior or posterior, but with the significant soft tissue component in addition to the bony foraminal stenosis that seems to be present on myelogram and anterior approach would be preferred.  The biggest risk here is the far lateral extent of the foraminal decompression which goes all the way to the vertebral artery, and poses risk to the vertebral artery in the extreme of the lateral decompression,  whether from anterior or posterior approach.       Nithin Cortes MD

## 2018-03-22 DIAGNOSIS — M48.02 FORAMINAL STENOSIS OF CERVICAL REGION: Primary | ICD-10-CM

## 2018-03-22 RX ORDER — SODIUM CHLORIDE, SODIUM LACTATE, POTASSIUM CHLORIDE, CALCIUM CHLORIDE 600; 310; 30; 20 MG/100ML; MG/100ML; MG/100ML; MG/100ML
100 INJECTION, SOLUTION INTRAVENOUS CONTINUOUS
Status: CANCELLED | OUTPATIENT
Start: 2018-03-22

## 2018-03-22 NOTE — H&P
Patient Care Team:  ELVIS Molina as PCP - General (Family Medicine)  Nigel Mantilla MD as Referring Physician (Pain Medicine)    Chief complaint   Upper extremity weakness; right greater than left    Subjective     History of Present Illness  The patient is a 63-year-old man who was seen in June of 2017 with symptoms of weakness in his upper extremities, right greater than left.  He lost triceps strength about 2 years previously and it partially returned.  He has been losing strength in his left arm with difficulty elevating it at the shoulder.   He has neck pain without specific unilateral arm pain.    MRI of the cervical spine done in May of 2017 showed right C6-7 foraminal stenosis, bilateral C5-6 foraminal stenosis with moderate central canal stenosis, and possible left C4-5 foraminal stenosis to explain the left sided shoulder elevation weakness.     A follow up myelogram was done on 3/5/18 which showed severe foraminal stenosis with a possible disc herniation at the entry into the foramen at C4-5 on the left consistent with the deltoid weakness.     Past Medical History:   Diagnosis Date   • Arthritis    • Asthma    • Cataract, bilateral    • DDD (degenerative disc disease), lumbar    • Hypertension      Past Surgical History:   Procedure Laterality Date   • CATARACT EXTRACTION W/ INTRAOCULAR LENS IMPLANT Left 7/6/2017    Procedure: CATARACT PHACO EXTRACTION WITH INTRAOCULAR LENS IMPLANT LEFT;  Surgeon: Anton Campa MD;  Location: [facility] OR;  Service:    • CATARACT EXTRACTION W/ INTRAOCULAR LENS IMPLANT Right 10/5/2017    Procedure: CATARACT PHACO EXTRACTION WITH INTRAOCULAR LENS IMPLANT RIGHT;  Surgeon: Anton Campa MD;  Location: [facility] OR;  Service:    • COLONOSCOPY     • FEMUR FRACTURE SURGERY Right 1976    [place], [place]   • INTERVENTIONAL RADIOLOGY PROCEDURE N/A 3/5/2018    Procedure: myelogram cervical spine;  Surgeon: Brandin Pratt MD;  Location: [facility] CATH INVASIVE LOCATION;   Service:      Review of Systems   Constitutional: Negative for activity change, appetite change, chills, diaphoresis, fatigue, fever and unexpected weight change.   HENT: Negative for congestion, dental problem, drooling, ear discharge, ear pain, facial swelling, hearing loss, mouth sores, nosebleeds, postnasal drip, rhinorrhea, sinus pressure, sneezing, sore throat, tinnitus, trouble swallowing and voice change.    Eyes: Negative for photophobia, pain, discharge, redness, itching and visual disturbance.   Respiratory: Negative for apnea, cough, choking, chest tightness, shortness of breath, wheezing and stridor.    Cardiovascular: Negative for chest pain, palpitations and leg swelling.   Gastrointestinal: Negative for abdominal distention, abdominal pain, anal bleeding, blood in stool, constipation, diarrhea, nausea, rectal pain and vomiting.   Endocrine: Negative for cold intolerance, heat intolerance, polydipsia, polyphagia and polyuria.   Genitourinary: Negative for decreased urine volume, difficulty urinating, dysuria, enuresis, flank pain, frequency, genital sores, hematuria and urgency.   Musculoskeletal: Positive for arthralgias, back pain and neck stiffness. Negative for gait problem, joint swelling, myalgias and neck pain.   Skin: Negative for color change, pallor, rash and wound.   Allergic/Immunologic: Negative for environmental allergies, food allergies and immunocompromised state.   Neurological: Positive for weakness. Negative for dizziness, tremors, seizures, syncope, facial asymmetry, speech difficulty, light-headedness, numbness and headaches.   Hematological: Negative for adenopathy. Does not bruise/bleed easily.   Psychiatric/Behavioral: Negative for agitation, behavioral problems, confusion, decreased concentration, dysphoric mood, hallucinations, self-injury, sleep disturbance and suicidal ideas. The patient is not nervous/anxious and is not hyperactive.     Objective        Physical  Exam  Weakness of the left deltoid sufficient in degree to allow him only about 30° of elevation and the remaining elevation has to be done with the other arm raising his left arm.  Atrophy of the right triceps.      Assessment & Plan  Anterior cervical discectomy and fusion at C4-5.  This could be approached from anterior or posterior, but with the significant soft tissue component in addition to the bony foraminal stenosis that seems to be present on myelogram and anterior approach would be preferred.  The biggest risk here is the far lateral extent of the foraminal decompression which goes all the way to the vertebral artery, and poses risk to the vertebral artery in the extreme of the lateral decompression, whether from anterior or posterior approach      Karina Osullivan PA-C  03/22/18  2:35 PM

## 2018-03-30 ENCOUNTER — APPOINTMENT (OUTPATIENT)
Dept: PREADMISSION TESTING | Facility: HOSPITAL | Age: 63
End: 2018-03-30

## 2018-04-06 PROBLEM — M48.02 FORAMINAL STENOSIS OF CERVICAL REGION: Status: ACTIVE | Noted: 2018-04-06

## 2018-04-06 RX ORDER — LIDOCAINE HYDROCHLORIDE 10 MG/ML
0.5 INJECTION, SOLUTION EPIDURAL; INFILTRATION; INTRACAUDAL; PERINEURAL ONCE AS NEEDED
Status: CANCELLED | OUTPATIENT
Start: 2018-04-06

## 2018-04-06 RX ORDER — FAMOTIDINE 20 MG/1
20 TABLET, FILM COATED ORAL ONCE
Status: CANCELLED | OUTPATIENT
Start: 2018-04-06 | End: 2018-04-06

## 2018-04-06 RX ORDER — SODIUM CHLORIDE, SODIUM LACTATE, POTASSIUM CHLORIDE, CALCIUM CHLORIDE 600; 310; 30; 20 MG/100ML; MG/100ML; MG/100ML; MG/100ML
9 INJECTION, SOLUTION INTRAVENOUS CONTINUOUS
Status: CANCELLED | OUTPATIENT
Start: 2018-04-06

## 2018-04-06 RX ORDER — SODIUM CHLORIDE 0.9 % (FLUSH) 0.9 %
1-10 SYRINGE (ML) INJECTION AS NEEDED
Status: CANCELLED | OUTPATIENT
Start: 2018-04-06

## 2018-05-09 ENCOUNTER — APPOINTMENT (OUTPATIENT)
Dept: PREADMISSION TESTING | Facility: HOSPITAL | Age: 63
End: 2018-05-09

## 2018-05-09 VITALS — HEIGHT: 71 IN | BODY MASS INDEX: 31.86 KG/M2 | WEIGHT: 227.6 LBS

## 2018-05-09 DIAGNOSIS — M48.02 FORAMINAL STENOSIS OF CERVICAL REGION: ICD-10-CM

## 2018-05-09 LAB
ALBUMIN SERPL-MCNC: 4.2 G/DL (ref 3.2–4.8)
ALBUMIN/GLOB SERPL: 1.3 G/DL (ref 1.5–2.5)
ALP SERPL-CCNC: 84 U/L (ref 25–100)
ALT SERPL W P-5'-P-CCNC: 128 U/L (ref 7–40)
ANION GAP SERPL CALCULATED.3IONS-SCNC: 6 MMOL/L (ref 3–11)
AST SERPL-CCNC: 106 U/L (ref 0–33)
BILIRUB SERPL-MCNC: 1.4 MG/DL (ref 0.3–1.2)
BUN BLD-MCNC: 10 MG/DL (ref 9–23)
BUN/CREAT SERPL: 14.3 (ref 7–25)
CALCIUM SPEC-SCNC: 9 MG/DL (ref 8.7–10.4)
CHLORIDE SERPL-SCNC: 102 MMOL/L (ref 99–109)
CO2 SERPL-SCNC: 26 MMOL/L (ref 20–31)
CREAT BLD-MCNC: 0.7 MG/DL (ref 0.6–1.3)
DEPRECATED RDW RBC AUTO: 46.7 FL (ref 37–54)
ERYTHROCYTE [DISTWIDTH] IN BLOOD BY AUTOMATED COUNT: 13 % (ref 11.3–14.5)
GFR SERPL CREATININE-BSD FRML MDRD: 114 ML/MIN/1.73
GLOBULIN UR ELPH-MCNC: 3.3 GM/DL
GLUCOSE BLD-MCNC: 104 MG/DL (ref 70–100)
HCT VFR BLD AUTO: 47.6 % (ref 38.9–50.9)
HGB BLD-MCNC: 16.6 G/DL (ref 13.1–17.5)
MCH RBC QN AUTO: 34.2 PG (ref 27–31)
MCHC RBC AUTO-ENTMCNC: 34.9 G/DL (ref 32–36)
MCV RBC AUTO: 97.9 FL (ref 80–99)
PLATELET # BLD AUTO: 137 10*3/MM3 (ref 150–450)
PMV BLD AUTO: 10.7 FL (ref 6–12)
POTASSIUM BLD-SCNC: 4.1 MMOL/L (ref 3.5–5.5)
PROT SERPL-MCNC: 7.5 G/DL (ref 5.7–8.2)
RBC # BLD AUTO: 4.86 10*6/MM3 (ref 4.2–5.76)
SODIUM BLD-SCNC: 134 MMOL/L (ref 132–146)
WBC NRBC COR # BLD: 4.89 10*3/MM3 (ref 3.5–10.8)

## 2018-05-09 PROCEDURE — 87077 CULTURE AEROBIC IDENTIFY: CPT | Performed by: ANESTHESIOLOGY

## 2018-05-09 PROCEDURE — 87147 CULTURE TYPE IMMUNOLOGIC: CPT | Performed by: ANESTHESIOLOGY

## 2018-05-09 PROCEDURE — 87081 CULTURE SCREEN ONLY: CPT | Performed by: ANESTHESIOLOGY

## 2018-05-09 PROCEDURE — 36415 COLL VENOUS BLD VENIPUNCTURE: CPT

## 2018-05-09 PROCEDURE — 85027 COMPLETE CBC AUTOMATED: CPT | Performed by: PHYSICIAN ASSISTANT

## 2018-05-09 PROCEDURE — 80053 COMPREHEN METABOLIC PANEL: CPT | Performed by: PHYSICIAN ASSISTANT

## 2018-05-09 PROCEDURE — 93005 ELECTROCARDIOGRAM TRACING: CPT | Performed by: ANESTHESIOLOGY

## 2018-05-09 NOTE — DISCHARGE INSTRUCTIONS

## 2018-05-09 NOTE — PAT
Bactroban and Chlorhexidine Prescription given during PAT visit, as well as written and verbal instructions given to patient during PAT visit.    Patient instructed to bring films back on the day of surgery

## 2018-05-10 LAB
MRSA SPEC QL CULT: ABNORMAL
MRSA SPEC QL CULT: ABNORMAL

## 2018-05-15 ENCOUNTER — ANESTHESIA EVENT (OUTPATIENT)
Dept: PERIOP | Facility: HOSPITAL | Age: 63
End: 2018-05-15

## 2018-05-16 ENCOUNTER — APPOINTMENT (OUTPATIENT)
Dept: GENERAL RADIOLOGY | Facility: HOSPITAL | Age: 63
End: 2018-05-16

## 2018-05-16 ENCOUNTER — HOSPITAL ENCOUNTER (OUTPATIENT)
Facility: HOSPITAL | Age: 63
Discharge: HOME | End: 2018-05-17
Attending: NEUROLOGICAL SURGERY | Admitting: NEUROLOGICAL SURGERY

## 2018-05-16 ENCOUNTER — ANESTHESIA (OUTPATIENT)
Dept: PERIOP | Facility: HOSPITAL | Age: 63
End: 2018-05-16

## 2018-05-16 DIAGNOSIS — M48.02 FORAMINAL STENOSIS OF CERVICAL REGION: ICD-10-CM

## 2018-05-16 PROCEDURE — C1713 ANCHOR/SCREW BN/BN,TIS/BN: HCPCS | Performed by: NEUROLOGICAL SURGERY

## 2018-05-16 PROCEDURE — 22551 ARTHRD ANT NTRBDY CERVICAL: CPT | Performed by: NEUROLOGICAL SURGERY

## 2018-05-16 PROCEDURE — 22845 INSERT SPINE FIXATION DEVICE: CPT | Performed by: NEUROLOGICAL SURGERY

## 2018-05-16 PROCEDURE — 99024 POSTOP FOLLOW-UP VISIT: CPT | Performed by: NEUROLOGICAL SURGERY

## 2018-05-16 PROCEDURE — 25010000002 HYDRALAZINE PER 20 MG: Performed by: NURSE ANESTHETIST, CERTIFIED REGISTERED

## 2018-05-16 PROCEDURE — 25010000002 FENTANYL CITRATE (PF) 100 MCG/2ML SOLUTION: Performed by: NURSE ANESTHETIST, CERTIFIED REGISTERED

## 2018-05-16 PROCEDURE — 25010000002 NEOSTIGMINE PER 0.5 MG: Performed by: NURSE ANESTHETIST, CERTIFIED REGISTERED

## 2018-05-16 PROCEDURE — 22853 INSJ BIOMECHANICAL DEVICE: CPT | Performed by: NEUROLOGICAL SURGERY

## 2018-05-16 PROCEDURE — 72020 X-RAY EXAM OF SPINE 1 VIEW: CPT

## 2018-05-16 PROCEDURE — 94799 UNLISTED PULMONARY SVC/PX: CPT

## 2018-05-16 PROCEDURE — 25010000002 PROPOFOL 10 MG/ML EMULSION: Performed by: NURSE ANESTHETIST, CERTIFIED REGISTERED

## 2018-05-16 PROCEDURE — 25010000002 DEXAMETHASONE PER 1 MG: Performed by: NURSE ANESTHETIST, CERTIFIED REGISTERED

## 2018-05-16 PROCEDURE — 25010000002 ONDANSETRON PER 1 MG: Performed by: NURSE ANESTHETIST, CERTIFIED REGISTERED

## 2018-05-16 PROCEDURE — 94760 N-INVAS EAR/PLS OXIMETRY 1: CPT

## 2018-05-16 PROCEDURE — 25010000002 VANCOMYCIN: Performed by: NEUROLOGICAL SURGERY

## 2018-05-16 PROCEDURE — 20930 SP BONE ALGRFT MORSEL ADD-ON: CPT | Performed by: NEUROLOGICAL SURGERY

## 2018-05-16 PROCEDURE — 25010000002 HYDROMORPHONE PER 4 MG: Performed by: NURSE ANESTHETIST, CERTIFIED REGISTERED

## 2018-05-16 DEVICE — SCREW 7723513 ZEVO VAR ST 3.5MM X 13MM
Type: IMPLANTABLE DEVICE | Site: SPINE CERVICAL | Status: FUNCTIONAL
Brand: ZEVO™ ANTERIOR CERVICAL PLATE SYSTEM

## 2018-05-16 DEVICE — PUTTY DBM GRAFTON 6CC: Type: IMPLANTABLE DEVICE | Site: SPINE CERVICAL | Status: FUNCTIONAL

## 2018-05-16 DEVICE — CAGE 5030664 ANATOMIC PTC 16X14X6MM
Type: IMPLANTABLE DEVICE | Site: SPINE CERVICAL | Status: FUNCTIONAL
Brand: ANATOMIC PEEK PTC CERVICAL FUSION SYSTEM

## 2018-05-16 RX ORDER — CEFAZOLIN SODIUM 2 G/100ML
2 INJECTION, SOLUTION INTRAVENOUS ONCE
Status: DISCONTINUED | OUTPATIENT
Start: 2018-05-16 | End: 2018-05-16

## 2018-05-16 RX ORDER — SODIUM CHLORIDE 0.9 % (FLUSH) 0.9 %
1-10 SYRINGE (ML) INJECTION AS NEEDED
Status: DISCONTINUED | OUTPATIENT
Start: 2018-05-16 | End: 2018-05-17 | Stop reason: HOSPADM

## 2018-05-16 RX ORDER — PROMETHAZINE HYDROCHLORIDE 25 MG/1
25 SUPPOSITORY RECTAL ONCE AS NEEDED
Status: DISCONTINUED | OUTPATIENT
Start: 2018-05-16 | End: 2018-05-16 | Stop reason: HOSPADM

## 2018-05-16 RX ORDER — MAGNESIUM HYDROXIDE 1200 MG/15ML
LIQUID ORAL AS NEEDED
Status: DISCONTINUED | OUTPATIENT
Start: 2018-05-16 | End: 2018-05-16 | Stop reason: HOSPADM

## 2018-05-16 RX ORDER — PROPOFOL 10 MG/ML
VIAL (ML) INTRAVENOUS AS NEEDED
Status: DISCONTINUED | OUTPATIENT
Start: 2018-05-16 | End: 2018-05-16 | Stop reason: SURG

## 2018-05-16 RX ORDER — OXYCODONE AND ACETAMINOPHEN 7.5; 325 MG/1; MG/1
1 TABLET ORAL EVERY 4 HOURS PRN
Status: DISCONTINUED | OUTPATIENT
Start: 2018-05-16 | End: 2018-05-17 | Stop reason: HOSPADM

## 2018-05-16 RX ORDER — HYDROMORPHONE HYDROCHLORIDE 1 MG/ML
0.5 INJECTION, SOLUTION INTRAMUSCULAR; INTRAVENOUS; SUBCUTANEOUS
Status: DISCONTINUED | OUTPATIENT
Start: 2018-05-16 | End: 2018-05-17 | Stop reason: HOSPADM

## 2018-05-16 RX ORDER — DEXAMETHASONE SODIUM PHOSPHATE 10 MG/ML
INJECTION INTRAMUSCULAR; INTRAVENOUS AS NEEDED
Status: DISCONTINUED | OUTPATIENT
Start: 2018-05-16 | End: 2018-05-16 | Stop reason: SURG

## 2018-05-16 RX ORDER — GLYCOPYRROLATE 0.2 MG/ML
INJECTION INTRAMUSCULAR; INTRAVENOUS AS NEEDED
Status: DISCONTINUED | OUTPATIENT
Start: 2018-05-16 | End: 2018-05-16 | Stop reason: SURG

## 2018-05-16 RX ORDER — ESMOLOL HYDROCHLORIDE 10 MG/ML
INJECTION INTRAVENOUS AS NEEDED
Status: DISCONTINUED | OUTPATIENT
Start: 2018-05-16 | End: 2018-05-16 | Stop reason: SURG

## 2018-05-16 RX ORDER — HYDRALAZINE HYDROCHLORIDE 20 MG/ML
5 INJECTION INTRAMUSCULAR; INTRAVENOUS
Status: DISCONTINUED | OUTPATIENT
Start: 2018-05-16 | End: 2018-05-17 | Stop reason: HOSPADM

## 2018-05-16 RX ORDER — BUDESONIDE AND FORMOTEROL FUMARATE DIHYDRATE 160; 4.5 UG/1; UG/1
2 AEROSOL RESPIRATORY (INHALATION)
Status: DISCONTINUED | OUTPATIENT
Start: 2018-05-16 | End: 2018-05-17 | Stop reason: HOSPADM

## 2018-05-16 RX ORDER — ATRACURIUM BESYLATE 10 MG/ML
INJECTION, SOLUTION INTRAVENOUS AS NEEDED
Status: DISCONTINUED | OUTPATIENT
Start: 2018-05-16 | End: 2018-05-16 | Stop reason: SURG

## 2018-05-16 RX ORDER — LIDOCAINE HYDROCHLORIDE 10 MG/ML
0.5 INJECTION, SOLUTION EPIDURAL; INFILTRATION; INTRACAUDAL; PERINEURAL ONCE AS NEEDED
Status: COMPLETED | OUTPATIENT
Start: 2018-05-16 | End: 2018-05-16

## 2018-05-16 RX ORDER — RAMIPRIL 5 MG/1
10 CAPSULE ORAL EVERY 12 HOURS SCHEDULED
Status: DISCONTINUED | OUTPATIENT
Start: 2018-05-16 | End: 2018-05-17 | Stop reason: HOSPADM

## 2018-05-16 RX ORDER — ACETAMINOPHEN 325 MG/1
650 TABLET ORAL EVERY 4 HOURS PRN
Status: DISCONTINUED | OUTPATIENT
Start: 2018-05-16 | End: 2018-05-17 | Stop reason: HOSPADM

## 2018-05-16 RX ORDER — FENTANYL CITRATE 50 UG/ML
50 INJECTION, SOLUTION INTRAMUSCULAR; INTRAVENOUS
Status: DISCONTINUED | OUTPATIENT
Start: 2018-05-16 | End: 2018-05-16 | Stop reason: HOSPADM

## 2018-05-16 RX ORDER — LIDOCAINE HYDROCHLORIDE 10 MG/ML
INJECTION, SOLUTION INFILTRATION; PERINEURAL AS NEEDED
Status: DISCONTINUED | OUTPATIENT
Start: 2018-05-16 | End: 2018-05-16 | Stop reason: SURG

## 2018-05-16 RX ORDER — ONDANSETRON 4 MG/1
4 TABLET, FILM COATED ORAL EVERY 6 HOURS PRN
Status: DISCONTINUED | OUTPATIENT
Start: 2018-05-16 | End: 2018-05-17 | Stop reason: HOSPADM

## 2018-05-16 RX ORDER — FAMOTIDINE 20 MG/1
20 TABLET, FILM COATED ORAL
Status: DISCONTINUED | OUTPATIENT
Start: 2018-05-16 | End: 2018-05-16 | Stop reason: HOSPADM

## 2018-05-16 RX ORDER — TEMAZEPAM 15 MG/1
15 CAPSULE ORAL NIGHTLY PRN
Status: DISCONTINUED | OUTPATIENT
Start: 2018-05-16 | End: 2018-05-17 | Stop reason: HOSPADM

## 2018-05-16 RX ORDER — ONDANSETRON 2 MG/ML
INJECTION INTRAMUSCULAR; INTRAVENOUS AS NEEDED
Status: DISCONTINUED | OUTPATIENT
Start: 2018-05-16 | End: 2018-05-16 | Stop reason: SURG

## 2018-05-16 RX ORDER — ENALAPRILAT 2.5 MG/2ML
1.25 INJECTION INTRAVENOUS EVERY 6 HOURS
Status: DISCONTINUED | OUTPATIENT
Start: 2018-05-16 | End: 2018-05-17 | Stop reason: HOSPADM

## 2018-05-16 RX ORDER — PROMETHAZINE HYDROCHLORIDE 25 MG/1
25 TABLET ORAL ONCE AS NEEDED
Status: DISCONTINUED | OUTPATIENT
Start: 2018-05-16 | End: 2018-05-16 | Stop reason: HOSPADM

## 2018-05-16 RX ORDER — ONDANSETRON 2 MG/ML
4 INJECTION INTRAMUSCULAR; INTRAVENOUS EVERY 6 HOURS PRN
Status: DISCONTINUED | OUTPATIENT
Start: 2018-05-16 | End: 2018-05-17 | Stop reason: HOSPADM

## 2018-05-16 RX ORDER — SODIUM CHLORIDE 0.9 % (FLUSH) 0.9 %
1-10 SYRINGE (ML) INJECTION AS NEEDED
Status: DISCONTINUED | OUTPATIENT
Start: 2018-05-16 | End: 2018-05-16 | Stop reason: HOSPADM

## 2018-05-16 RX ORDER — HYDROMORPHONE HYDROCHLORIDE 1 MG/ML
0.5 INJECTION, SOLUTION INTRAMUSCULAR; INTRAVENOUS; SUBCUTANEOUS
Status: DISCONTINUED | OUTPATIENT
Start: 2018-05-16 | End: 2018-05-16 | Stop reason: HOSPADM

## 2018-05-16 RX ORDER — FENTANYL CITRATE 50 UG/ML
INJECTION, SOLUTION INTRAMUSCULAR; INTRAVENOUS AS NEEDED
Status: DISCONTINUED | OUTPATIENT
Start: 2018-05-16 | End: 2018-05-16 | Stop reason: SURG

## 2018-05-16 RX ORDER — SODIUM CHLORIDE, SODIUM LACTATE, POTASSIUM CHLORIDE, CALCIUM CHLORIDE 600; 310; 30; 20 MG/100ML; MG/100ML; MG/100ML; MG/100ML
50 INJECTION, SOLUTION INTRAVENOUS CONTINUOUS
Status: DISCONTINUED | OUTPATIENT
Start: 2018-05-16 | End: 2018-05-17 | Stop reason: HOSPADM

## 2018-05-16 RX ORDER — NALOXONE HCL 0.4 MG/ML
0.4 VIAL (ML) INJECTION
Status: DISCONTINUED | OUTPATIENT
Start: 2018-05-16 | End: 2018-05-17 | Stop reason: HOSPADM

## 2018-05-16 RX ORDER — PROMETHAZINE HYDROCHLORIDE 25 MG/ML
6.25 INJECTION, SOLUTION INTRAMUSCULAR; INTRAVENOUS ONCE AS NEEDED
Status: DISCONTINUED | OUTPATIENT
Start: 2018-05-16 | End: 2018-05-16 | Stop reason: HOSPADM

## 2018-05-16 RX ORDER — CARVEDILOL 6.25 MG/1
6.25 TABLET ORAL 2 TIMES DAILY WITH MEALS
Status: DISCONTINUED | OUTPATIENT
Start: 2018-05-16 | End: 2018-05-17 | Stop reason: HOSPADM

## 2018-05-16 RX ORDER — SODIUM CHLORIDE 9 MG/ML
INJECTION, SOLUTION INTRAVENOUS AS NEEDED
Status: DISCONTINUED | OUTPATIENT
Start: 2018-05-16 | End: 2018-05-16 | Stop reason: HOSPADM

## 2018-05-16 RX ORDER — SODIUM CHLORIDE, SODIUM LACTATE, POTASSIUM CHLORIDE, CALCIUM CHLORIDE 600; 310; 30; 20 MG/100ML; MG/100ML; MG/100ML; MG/100ML
9 INJECTION, SOLUTION INTRAVENOUS CONTINUOUS PRN
Status: DISCONTINUED | OUTPATIENT
Start: 2018-05-16 | End: 2018-05-17 | Stop reason: HOSPADM

## 2018-05-16 RX ADMIN — FAMOTIDINE 20 MG: 20 TABLET ORAL at 06:44

## 2018-05-16 RX ADMIN — HYDROMORPHONE HYDROCHLORIDE 0.5 MG: 1 INJECTION, SOLUTION INTRAMUSCULAR; INTRAVENOUS; SUBCUTANEOUS at 09:53

## 2018-05-16 RX ADMIN — FENTANYL CITRATE 100 MCG: 50 INJECTION, SOLUTION INTRAMUSCULAR; INTRAVENOUS at 07:54

## 2018-05-16 RX ADMIN — SODIUM CHLORIDE, POTASSIUM CHLORIDE, SODIUM LACTATE AND CALCIUM CHLORIDE 50 ML/HR: 600; 310; 30; 20 INJECTION, SOLUTION INTRAVENOUS at 12:05

## 2018-05-16 RX ADMIN — ONDANSETRON 4 MG: 2 INJECTION INTRAMUSCULAR; INTRAVENOUS at 09:10

## 2018-05-16 RX ADMIN — FENTANYL CITRATE 100 MCG: 50 INJECTION, SOLUTION INTRAMUSCULAR; INTRAVENOUS at 07:38

## 2018-05-16 RX ADMIN — Medication: at 09:56

## 2018-05-16 RX ADMIN — PROPOFOL 50 MG: 10 INJECTION, EMULSION INTRAVENOUS at 08:24

## 2018-05-16 RX ADMIN — LIDOCAINE HYDROCHLORIDE 100 MG: 10 INJECTION, SOLUTION INFILTRATION; PERINEURAL at 07:38

## 2018-05-16 RX ADMIN — GLYCOPYRROLATE 0.4 MG: 0.2 INJECTION, SOLUTION INTRAMUSCULAR; INTRAVENOUS at 09:14

## 2018-05-16 RX ADMIN — LIDOCAINE HYDROCHLORIDE 0.2 ML: 10 INJECTION, SOLUTION EPIDURAL; INFILTRATION; INTRACAUDAL; PERINEURAL at 06:44

## 2018-05-16 RX ADMIN — ESMOLOL HYDROCHLORIDE 20 MG: 10 INJECTION INTRAVENOUS at 07:51

## 2018-05-16 RX ADMIN — SODIUM CHLORIDE, POTASSIUM CHLORIDE, SODIUM LACTATE AND CALCIUM CHLORIDE 9 ML/HR: 600; 310; 30; 20 INJECTION, SOLUTION INTRAVENOUS at 06:44

## 2018-05-16 RX ADMIN — PROPOFOL 50 MG: 10 INJECTION, EMULSION INTRAVENOUS at 07:57

## 2018-05-16 RX ADMIN — DEXAMETHASONE SODIUM PHOSPHATE 8 MG: 10 INJECTION INTRAMUSCULAR; INTRAVENOUS at 08:05

## 2018-05-16 RX ADMIN — OXYCODONE HYDROCHLORIDE AND ACETAMINOPHEN 1 TABLET: 7.5; 325 TABLET ORAL at 22:47

## 2018-05-16 RX ADMIN — CARVEDILOL 6.25 MG: 6.25 TABLET, FILM COATED ORAL at 15:29

## 2018-05-16 RX ADMIN — VANCOMYCIN HYDROCHLORIDE 1500 MG: 1 INJECTION, POWDER, LYOPHILIZED, FOR SOLUTION INTRAVENOUS at 06:48

## 2018-05-16 RX ADMIN — FENTANYL CITRATE: 50 INJECTION INTRAMUSCULAR; INTRAVENOUS at 10:25

## 2018-05-16 RX ADMIN — ESMOLOL HYDROCHLORIDE 30 MG: 10 INJECTION INTRAVENOUS at 07:42

## 2018-05-16 RX ADMIN — HYDROMORPHONE HYDROCHLORIDE 0.5 MG: 1 INJECTION, SOLUTION INTRAMUSCULAR; INTRAVENOUS; SUBCUTANEOUS at 10:06

## 2018-05-16 RX ADMIN — ATRACURIUM BESYLATE 50 MG: 10 INJECTION, SOLUTION INTRAVENOUS at 07:38

## 2018-05-16 RX ADMIN — RAMIPRIL 10 MG: 5 CAPSULE ORAL at 15:29

## 2018-05-16 RX ADMIN — PROPOFOL 200 MG: 10 INJECTION, EMULSION INTRAVENOUS at 07:38

## 2018-05-16 RX ADMIN — SODIUM CHLORIDE, POTASSIUM CHLORIDE, SODIUM LACTATE AND CALCIUM CHLORIDE: 600; 310; 30; 20 INJECTION, SOLUTION INTRAVENOUS at 09:13

## 2018-05-16 RX ADMIN — METOPROLOL TARTRATE 1 MG: 5 INJECTION, SOLUTION INTRAVENOUS at 08:23

## 2018-05-16 RX ADMIN — BUDESONIDE AND FORMOTEROL FUMARATE DIHYDRATE 2 PUFF: 160; 4.5 AEROSOL RESPIRATORY (INHALATION) at 21:53

## 2018-05-16 RX ADMIN — Medication: at 10:49

## 2018-05-16 RX ADMIN — Medication 3 MG: at 09:14

## 2018-05-16 RX ADMIN — OXYCODONE HYDROCHLORIDE AND ACETAMINOPHEN 1 TABLET: 7.5; 325 TABLET ORAL at 15:29

## 2018-05-16 RX ADMIN — FENTANYL CITRATE: 50 INJECTION INTRAMUSCULAR; INTRAVENOUS at 10:45

## 2018-05-16 NOTE — ANESTHESIA PREPROCEDURE EVALUATION
Anesthesia Evaluation     Patient summary reviewed and Nursing notes reviewed   NPO Solid Status: > 8 hours  NPO Liquid Status: > 8 hours           Airway   Mallampati: II  TM distance: >3 FB  Neck ROM: full  No difficulty expected  Dental    (+) poor dentition        Pulmonary    (+) a smoker Current Smoked day of surgery, COPD moderate, asthma,   (-) shortness of breath (stable ), recent URI  Cardiovascular     ECG reviewed    (+) hypertension,   (-) past MI, CAD, dysrhythmias, angina, cardiac stents, CABG    ROS comment: EKG Normal NSR     Neuro/Psych  C-spine cleared  (+) psychiatric history Anxiety and Depression,     (-) TIA, CVA    ROS Comment: DDD HNP   GI/Hepatic/Renal/Endo      Musculoskeletal     Abdominal    Substance History      OB/GYN          Other   (+) arthritis         Phys Exam Other: multiple missing teeth -none loose ( Pt states)   Poor dentition   Beard                Anesthesia Plan    ASA 3     general     intravenous induction   Anesthetic plan and risks discussed with patient.    Plan discussed with CRNA.

## 2018-05-16 NOTE — ANESTHESIA PROCEDURE NOTES
Airway  Urgency: elective    Airway not difficult    General Information and Staff    Patient location during procedure: OR  CRNA: JADA GUZMAN    Indications and Patient Condition  Indications for airway management: airway protection    Preoxygenated: yes  MILS maintained throughout  Mask difficulty assessment: 2 - vent by mask + OA or adjuvant +/- NMBA    Final Airway Details  Final airway type: endotracheal airway      Successful airway: ETT  Cuffed: yes   Successful intubation technique: direct laryngoscopy  Endotracheal tube insertion site: oral  Blade: George  Blade size: #2  ETT size: 7.5 mm  Cormack-Lehane Classification: grade I - full view of glottis  Placement verified by: chest auscultation and capnometry   Cuff volume (mL): 5  Measured from: lips  ETT to lips (cm): 23  Number of attempts at approach: 1    Additional Comments  Pt to OR  11. Pt moved self to OR table. ASA monitors placed. Pre-O2 with 100% Oxygen. SIVI. Neck inline stability maintained. Atraumatic intubation.

## 2018-05-16 NOTE — ANESTHESIA POSTPROCEDURE EVALUATION
Patient: Luis Crenshaw    Procedure Summary     Date:  05/16/18 Room / Location:   [facility] /  [facility]    Anesthesia Start:  0729 Anesthesia Stop:      Procedure:  CERVICAL DISCECTOMY ANTERIOR WITH FUSION C4-5 BILATERAL (Bilateral Spine Cervical) Diagnosis:       Foraminal stenosis of cervical region      (Foraminal stenosis of cervical region [M99.81])    Surgeon:  Nithin Cortes MD Provider:  Sumeet Modi MD    Anesthesia Type:  general ASA Status:  3          Anesthesia Type: general  Last vitals 5/16/18 @0933  BP   173/109 on Right arm. Lopressor 2mg IV given.  Repeat /108. Dr. Cortes aware of BP.   Temp   97.9   Pulse   97   Resp   20   SpO2   98%      Post Anesthesia Care and Evaluation    Patient location during evaluation: PACU  Patient participation: complete - patient participated  Level of consciousness: awake  Pain score: 0  Pain management: adequate  Airway patency: patent  Anesthetic complications: No anesthetic complications    Cardiovascular status: stable  Respiratory status: acceptable, nasal cannula and spontaneous ventilation  Hydration status: stable    Comments: Pt transferred to PACU with O2. Vital signs stable. Report to PACU RN and care accepted. Moves all extremities. Awake and following commands. PT with increased BP. Denies pain. Lopressor 2mg IV given.

## 2018-05-17 VITALS
BODY MASS INDEX: 31.78 KG/M2 | HEART RATE: 75 BPM | DIASTOLIC BLOOD PRESSURE: 82 MMHG | HEIGHT: 71 IN | SYSTOLIC BLOOD PRESSURE: 122 MMHG | WEIGHT: 227 LBS | TEMPERATURE: 98 F | OXYGEN SATURATION: 93 % | RESPIRATION RATE: 18 BRPM

## 2018-05-17 PROCEDURE — 94640 AIRWAY INHALATION TREATMENT: CPT

## 2018-05-17 PROCEDURE — 99024 POSTOP FOLLOW-UP VISIT: CPT | Performed by: NEUROLOGICAL SURGERY

## 2018-05-17 RX ORDER — HYDROCODONE BITARTRATE AND ACETAMINOPHEN 10; 325 MG/1; MG/1
1 TABLET ORAL 3 TIMES DAILY
Qty: 40 TABLET | Refills: 0 | Status: SHIPPED | OUTPATIENT
Start: 2018-05-17 | End: 2020-03-11 | Stop reason: SDUPTHER

## 2018-05-17 RX ADMIN — OXYCODONE HYDROCHLORIDE AND ACETAMINOPHEN 1 TABLET: 7.5; 325 TABLET ORAL at 06:22

## 2018-05-17 RX ADMIN — BUDESONIDE AND FORMOTEROL FUMARATE DIHYDRATE 2 PUFF: 160; 4.5 AEROSOL RESPIRATORY (INHALATION) at 10:06

## 2018-09-05 ENCOUNTER — HOSPITAL ENCOUNTER (OUTPATIENT)
Dept: CT IMAGING | Facility: HOSPITAL | Age: 63
Discharge: HOME | End: 2018-09-05
Payer: MEDICAID

## 2018-09-05 DIAGNOSIS — F17.219 CIGARETTE NICOTINE DEPENDENCE WITH NICOTINE-INDUCED DISORDER: ICD-10-CM

## 2018-09-05 PROCEDURE — G0297 LDCT FOR LUNG CA SCREEN: HCPCS

## 2020-02-12 ENCOUNTER — HOSPITAL ENCOUNTER (OUTPATIENT)
Dept: ULTRASOUND IMAGING | Facility: HOSPITAL | Age: 65
Discharge: HOME | End: 2020-02-12
Payer: MEDICARE

## 2020-02-12 ENCOUNTER — HOSPITAL ENCOUNTER (OUTPATIENT)
Dept: CT IMAGING | Facility: HOSPITAL | Age: 65
Discharge: HOME | End: 2020-02-12
Payer: MEDICARE

## 2020-02-12 PROCEDURE — G0297 LDCT FOR LUNG CA SCREEN: HCPCS

## 2020-02-12 PROCEDURE — 76706 US ABDL AORTA SCREEN AAA: CPT

## 2020-02-27 ENCOUNTER — OFFICE VISIT (OUTPATIENT)
Dept: NEUROSURGERY | Facility: CLINIC | Age: 65
End: 2020-02-27

## 2020-02-27 VITALS — BODY MASS INDEX: 31.78 KG/M2 | WEIGHT: 227 LBS | HEIGHT: 71 IN | TEMPERATURE: 97.7 F

## 2020-02-27 DIAGNOSIS — M79.602 ARM PAIN, ANTERIOR, LEFT: Primary | ICD-10-CM

## 2020-02-27 DIAGNOSIS — M50.30 BULGING OF CERVICAL INTERVERTEBRAL DISC: ICD-10-CM

## 2020-02-27 DIAGNOSIS — G54.0 BRACHIAL PLEXITIS: ICD-10-CM

## 2020-02-27 PROCEDURE — 99203 OFFICE O/P NEW LOW 30 MIN: CPT | Performed by: NEUROLOGICAL SURGERY

## 2020-02-27 RX ORDER — HYDROCODONE BITARTRATE AND ACETAMINOPHEN 10; 325 MG/1; MG/1
1 TABLET ORAL
COMMUNITY

## 2020-02-27 RX ORDER — DULOXETIN HYDROCHLORIDE 30 MG/1
CAPSULE, DELAYED RELEASE ORAL
COMMUNITY
Start: 2020-01-10

## 2020-02-27 NOTE — PROGRESS NOTES
Subjective   Patient ID: Luis Crenshaw is a 65 y.o. 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  He has a history of weakness in his proximal left arm over the past 3 months that began spontaneously and has shown no improvement despite having some steroid injections.  He sees Dr. Edi Weller for pain management.  He has a past history of ACDF at C4-5 that I did about 2 years ago.  His problem with his surgery 2 years ago was preoperative deltoid weakness on the left and a C4-5 ACDF was done, and I did not see him in follow-up as he did not keep his appointment.  Now his left arm has become relatively useless to him because he is unable to flex his left biceps or elevate his arm with the deltoid at the shoulder.    Review of Radiographic Studies:  Cervical MRI scan was done on 10/30/2019 and shows a mild stenosis at C5-6 below his fusion, but the axial film is obscured by metal artifact at that level and there is no clear diagnosis that can be made.    The following portions of the patient's history were reviewed, updated as appropriate and approved: allergies, current medications, past family history, past medical history, past social history, past surgical history, review of systems and problem list.    Review of Systems   Musculoskeletal: Positive for myalgias, neck pain and neck stiffness.   Neurological: Positive for numbness.   All other systems reviewed and are negative.      Objective     NEUROLOGICAL EXAMINATION:      MENTAL STATUS:  Alert and oriented.  Speech intact.  Recent and remote memory intact.      CRANIAL NERVES:  Cranial nerve II:  Visual fields are full.  Cranial nerves III, IV and VI:  PERRLADC.  Extraocular movements are intact.  Nystagmus is not present.  Cranial nerve V:  Facial sensation is intact.  Cranial nerve VII:   Muscles of facial expression reveal no asymmetry.  Cranial nerve VIII:  Hearing is intact.  Cranial nerves IX and X:  Palate elevates symmetrically.  Cranial nerve XI:  Shoulder shrug is intact.  Cranial nerve XII:  Tongue is midline without evidence of atrophy or fasciculation.    MUSCULOSKELETAL: Atrophy of the left deltoid and biceps    MOTOR: Marked weakness left deltoid and biceps, tricep extension intact and  intact on the left.  Right-sided motor intact.    SENSATION: Diminished sensation over the deltoid on the left arm.    REFLEXES:  DTR absent both biceps and triceps bilaterally.      Assessment   Symptoms most consistent with a proximal brachial plexitis or at least brachial plexus dysfunction, rather than a cervical nerve root motor radiculopathy.       Plan   EMG/NCV left upper extremity.  He says his symptoms have been present for about the past 3 months which would be consistent with brachial plexitis, although he had deltoid weakness with his last surgery in 2018.  If the EMG is positive for brachial plexus, there might be reason to consider MRI imaging of the brachial plexus.  If cervical radiculopathy seems to be the problem, cervical myelogram would be necessary.  He will see Dr. Medeiros after his EMG/NCV study.       Nithin Cortes MD

## 2020-03-09 ENCOUNTER — HOSPITAL ENCOUNTER (OUTPATIENT)
Dept: NEUROLOGY | Facility: HOSPITAL | Age: 65
Discharge: HOME | End: 2020-03-09
Admitting: NEUROLOGICAL SURGERY

## 2020-03-09 DIAGNOSIS — M79.602 ARM PAIN, ANTERIOR, LEFT: ICD-10-CM

## 2020-03-09 DIAGNOSIS — G54.0 BRACHIAL PLEXITIS: ICD-10-CM

## 2020-03-09 DIAGNOSIS — M50.30 BULGING OF CERVICAL INTERVERTEBRAL DISC: ICD-10-CM

## 2020-03-09 PROCEDURE — 95908 NRV CNDJ TST 3-4 STUDIES: CPT

## 2020-03-09 PROCEDURE — 95886 MUSC TEST DONE W/N TEST COMP: CPT

## 2020-03-11 ENCOUNTER — OFFICE VISIT (OUTPATIENT)
Dept: NEUROSURGERY | Facility: CLINIC | Age: 65
End: 2020-03-11

## 2020-03-11 VITALS — WEIGHT: 234 LBS | TEMPERATURE: 98.9 F | HEIGHT: 71 IN | BODY MASS INDEX: 32.76 KG/M2

## 2020-03-11 DIAGNOSIS — R29.898 LEFT ARM WEAKNESS: ICD-10-CM

## 2020-03-11 DIAGNOSIS — G54.0 BRACHIAL PLEXITIS: Primary | ICD-10-CM

## 2020-03-11 PROCEDURE — 99213 OFFICE O/P EST LOW 20 MIN: CPT | Performed by: NEUROLOGICAL SURGERY

## 2020-03-11 RX ORDER — DIAZEPAM 10 MG/1
10 TABLET ORAL TAKE AS DIRECTED
Qty: 2 TABLET | Refills: 0 | OUTPATIENT
Start: 2020-03-11 | End: 2020-06-03

## 2020-03-11 NOTE — PROGRESS NOTES
Patient: Luis Crenshaw  : 1955    Primary Care Provider: Nisha Houston APRN    Requesting Provider: As above        History    Chief Complaint: Left upper extremity weakness.    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  A couple of years ago he underwent ACDF at C4-5 given some right upper extremity weakness.  He did not follow-up.  He now complains of a 5 to 6-month history of left upper extremity weakness.  This has been progressive and did not occur suddenly.  He has some neck pain.  He complains of some numbness in the left upper arm.    Review of Systems   Constitutional: Negative for activity change, appetite change, chills, diaphoresis, fatigue, fever and unexpected weight change.   HENT: Negative for congestion, dental problem, drooling, ear discharge, ear pain, facial swelling, hearing loss, mouth sores, nosebleeds, postnasal drip, rhinorrhea, sinus pressure, sneezing, sore throat, tinnitus, trouble swallowing and voice change.    Eyes: Negative for photophobia, pain, discharge, redness, itching and visual disturbance.   Respiratory: Negative for apnea, cough, choking, chest tightness, shortness of breath, wheezing and stridor.    Cardiovascular: Negative for chest pain, palpitations and leg swelling.   Gastrointestinal: Negative for abdominal distention, abdominal pain, anal bleeding, blood in stool, constipation, diarrhea, nausea, rectal pain and vomiting.   Endocrine: Negative for cold intolerance, heat intolerance, polydipsia, polyphagia and polyuria.   Genitourinary: Negative for decreased urine volume, difficulty urinating, dysuria, enuresis, flank pain, frequency, genital sores, hematuria and urgency.   Musculoskeletal: Positive for myalgias, neck pain and neck stiffness. Negative for arthralgias, back pain, gait problem and joint swelling.   Skin: Negative for color change, pallor, rash and wound.   Allergic/Immunologic:  "Negative for environmental allergies, food allergies and immunocompromised state.   Neurological: Positive for numbness. Negative for dizziness, tremors, seizures, syncope, facial asymmetry, speech difficulty, weakness, light-headedness and headaches.   Hematological: Negative for adenopathy. Does not bruise/bleed easily.   Psychiatric/Behavioral: Negative for agitation, behavioral problems, confusion, decreased concentration, dysphoric mood, hallucinations, self-injury, sleep disturbance and suicidal ideas. The patient is not nervous/anxious and is not hyperactive.    All other systems reviewed and are negative.      The patient's past medical history, past surgical history, family history, and social history have been reviewed at length in the electronic medical record.    Physical Exam:   Temp 98.9 °F (37.2 °C) (Temporal)   Ht 180.3 cm (71\")   Wt 106 kg (234 lb)   BMI 32.64 kg/m²   MUSCULOSKELETAL:  Neck tenderness to palpation is not observed.   ROM in neck is normal.  NEUROLOGICAL:  Strength is 2/5 in the left deltoid and 4-/5 in the left bicep.  Other groups appear to be intact.  Muscle tone is normal throughout.  Station and gait are normal.  Sensation is diminished to light touch testing in the left upper arm.  Deep tendon reflexes are 2+ and symmetrical.  Selene's Sign is negative bilaterally.         Medical Decision Making    Data Review:   MRI of the cervical spine demonstrates some changes related to prior C4-5 fusion.  There is some uncinate overgrowth more leftward at C5-6.  There is some broad-based burring at C3-4.    Electrodiagnostic studies suggest mild left carpal tunnel syndrome as well as a chronic upper trunk brachial plexopathy versus a C5/6 radiculopathy which was less likely.    Diagnosis:   The predominance of the patient's weakness is deltoid weakness which would be more C5 mediated.  I suspect he has some degree of brachial plexitis.    Treatment Options:   I am going to check an " MRI of the brachial plexus and see him in follow-up.  Further recommendations will ensue.     Diagnosis Plan   1. Brachial plexitis  MRI brachial plexus wo contrast    diazePAM (VALIUM) 10 MG tablet   2. Left arm weakness         Scribed for Zander Medeiros MD by Zayra Natarajan CMA on 03/11/2020 at 9:47 AM      I, Dr. Medeiros, personally performed the services described in the documentation, as scribed in my presence, and it is both accurate and complete.

## 2020-03-23 ENCOUNTER — HOSPITAL ENCOUNTER (OUTPATIENT)
Dept: MRI IMAGING | Facility: HOSPITAL | Age: 65
Discharge: HOME | End: 2020-03-23
Admitting: NEUROLOGICAL SURGERY

## 2020-03-23 DIAGNOSIS — G54.0 BRACHIAL PLEXITIS: ICD-10-CM

## 2020-03-23 PROCEDURE — 73221 MRI JOINT UPR EXTREM W/O DYE: CPT

## 2020-03-25 ENCOUNTER — OFFICE VISIT (OUTPATIENT)
Dept: NEUROSURGERY | Facility: CLINIC | Age: 65
End: 2020-03-25

## 2020-03-25 VITALS — HEIGHT: 71 IN | WEIGHT: 235.6 LBS | TEMPERATURE: 96.5 F | BODY MASS INDEX: 32.98 KG/M2

## 2020-03-25 DIAGNOSIS — R29.898 LEFT ARM WEAKNESS: ICD-10-CM

## 2020-03-25 DIAGNOSIS — G54.0 BRACHIAL PLEXITIS: Primary | ICD-10-CM

## 2020-03-25 DIAGNOSIS — M54.12 CERVICAL RADICULOPATHY: ICD-10-CM

## 2020-03-25 PROCEDURE — 99213 OFFICE O/P EST LOW 20 MIN: CPT | Performed by: NEUROLOGICAL SURGERY

## 2020-03-25 NOTE — PROGRESS NOTES
Patient: Luis Crenshaw  : 1955    Primary Care Provider: Nisha Houston APRN    Requesting Provider: As above        History    Chief Complaint: Left upper extremity weakness.    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  A couple of years ago he underwent ACDF at C4-5 given some right upper extremity weakness.  He did not follow-up.  He now complains of a 5 to 6-month history of left upper extremity weakness.  This has been progressive and did not occur suddenly.  Early on he did have some right arm pain but that has gone away.  He has some neck pain.  He complains of some numbness in the left upper arm.    Review of Systems   Constitutional: Negative for activity change, appetite change, chills, diaphoresis, fatigue, fever and unexpected weight change.   HENT: Negative for congestion, dental problem, drooling, ear discharge, ear pain, facial swelling, hearing loss, mouth sores, nosebleeds, postnasal drip, rhinorrhea, sinus pressure, sneezing, sore throat, tinnitus, trouble swallowing and voice change.    Eyes: Negative for photophobia, pain, discharge, redness, itching and visual disturbance.   Respiratory: Negative for apnea, cough, choking, chest tightness, shortness of breath, wheezing and stridor.    Cardiovascular: Negative for chest pain, palpitations and leg swelling.   Gastrointestinal: Negative for abdominal distention, abdominal pain, anal bleeding, blood in stool, constipation, diarrhea, nausea, rectal pain and vomiting.   Endocrine: Negative for cold intolerance, heat intolerance, polydipsia, polyphagia and polyuria.   Genitourinary: Negative for decreased urine volume, difficulty urinating, dysuria, enuresis, flank pain, frequency, genital sores, hematuria and urgency.   Musculoskeletal: Positive for myalgias, neck pain and neck stiffness. Negative for arthralgias, back pain, gait problem and joint swelling.   Skin: Negative for  "color change, pallor, rash and wound.   Allergic/Immunologic: Negative for environmental allergies, food allergies and immunocompromised state.   Neurological: Positive for numbness. Negative for dizziness, tremors, seizures, syncope, facial asymmetry, speech difficulty, weakness, light-headedness and headaches.   Hematological: Negative for adenopathy. Does not bruise/bleed easily.   Psychiatric/Behavioral: Negative for agitation, behavioral problems, confusion, decreased concentration, dysphoric mood, hallucinations, self-injury, sleep disturbance and suicidal ideas. The patient is not nervous/anxious and is not hyperactive.    All other systems reviewed and are negative.      The patient's past medical history, past surgical history, family history, and social history have been reviewed at length in the electronic medical record.    Physical Exam:   Temp 96.5 °F (35.8 °C) (Temporal)   Ht 180.3 cm (71\")   Wt 107 kg (235 lb 9.6 oz)   BMI 32.86 kg/m²   MUSCULOSKELETAL:  Neck tenderness to palpation is not observed.   ROM in neck is normal.  NEUROLOGICAL:  Strength is intact in the upper and lower extremities to direct testing except for left shoulder abduction which is 2/5 and left deltoid function which is 4-/5.  Muscle tone is normal throughout.  Station and gait are normal.  Sensation is intact to light touch testing throughout.    Medical Decision Making    Data Review:   MRI of the cervical spine demonstrates some changes related to prior C4-5 fusion.  There is some uncinate overgrowth more leftward at C5-6.  There is some broad-based burring at C3-4.     Electrodiagnostic studies suggest mild left carpal tunnel syndrome as well as a chronic upper trunk brachial plexopathy versus a C5/6 radiculopathy which was less likely.    MRI of the brachial plexus demonstrated no abnormality of the brachial plexus itself.  Muscle atrophy involving some of the shoulder muscles was identified consistent with a potential " brachial plexopathy.       Diagnosis:   1.  Brachial neuritis.  2.  Possible cervical radiculopathy.    Treatment Options:   My suspicion is that most of the patient's problems are due to a brachial plexus inflammatory process.  There could be nerve root compromise at C5-6 that could be contributing.  I am going to set up a cervical CT myelogram to delineate the extent of his disease at C5-6.       Diagnosis Plan   1. Brachial plexitis     2. Cervical radiculopathy  Obtain Informed Consent    IR Myelogram Cervical Spine    No Lab Testing Needed   3. Left arm weakness         Scribed for Zander Medeiros MD by Zayra Natarajan CMA on 03/25/2020 at 12:44 PM      I, Dr. Medeiros, personally performed the services described in the documentation, as scribed in my presence, and it is both accurate and complete.

## 2020-06-02 ENCOUNTER — HOSPITAL ENCOUNTER (OUTPATIENT)
Dept: CT IMAGING | Facility: HOSPITAL | Age: 65
Discharge: HOME | End: 2020-06-02

## 2020-06-02 ENCOUNTER — HOSPITAL ENCOUNTER (OUTPATIENT)
Dept: GENERAL RADIOLOGY | Facility: HOSPITAL | Age: 65
Discharge: HOME | End: 2020-06-02
Attending: NEUROLOGICAL SURGERY | Admitting: NEUROLOGICAL SURGERY

## 2020-06-02 VITALS
RESPIRATION RATE: 18 BRPM | OXYGEN SATURATION: 93 % | HEART RATE: 66 BPM | BODY MASS INDEX: 33.73 KG/M2 | SYSTOLIC BLOOD PRESSURE: 135 MMHG | WEIGHT: 235.6 LBS | DIASTOLIC BLOOD PRESSURE: 87 MMHG | HEIGHT: 70 IN | TEMPERATURE: 97.9 F

## 2020-06-02 DIAGNOSIS — M54.12 CERVICAL RADICULOPATHY: ICD-10-CM

## 2020-06-02 PROCEDURE — 72240 MYELOGRAPHY NECK SPINE: CPT

## 2020-06-02 PROCEDURE — 25010000002 IOPAMIDOL 61 % SOLUTION: Performed by: NEUROLOGICAL SURGERY

## 2020-06-02 PROCEDURE — 72126 CT NECK SPINE W/DYE: CPT

## 2020-06-02 PROCEDURE — 25010000003 LIDOCAINE 1 % SOLUTION: Performed by: NEUROLOGICAL SURGERY

## 2020-06-02 PROCEDURE — 62302 MYELOGRAPHY LUMBAR INJECTION: CPT

## 2020-06-02 RX ORDER — LISINOPRIL AND HYDROCHLOROTHIAZIDE 25; 20 MG/1; MG/1
1 TABLET ORAL DAILY
COMMUNITY
End: 2020-06-03

## 2020-06-02 RX ORDER — LIDOCAINE HYDROCHLORIDE 10 MG/ML
10 INJECTION, SOLUTION INFILTRATION; PERINEURAL ONCE
Status: COMPLETED | OUTPATIENT
Start: 2020-06-02 | End: 2020-06-02

## 2020-06-02 RX ORDER — PROMETHAZINE HYDROCHLORIDE 25 MG/1
25 TABLET ORAL ONCE AS NEEDED
Status: DISCONTINUED | OUTPATIENT
Start: 2020-06-02 | End: 2020-06-02 | Stop reason: HOSPADM

## 2020-06-02 RX ORDER — PROMETHAZINE HYDROCHLORIDE 25 MG/ML
25 INJECTION, SOLUTION INTRAMUSCULAR; INTRAVENOUS ONCE AS NEEDED
Status: DISCONTINUED | OUTPATIENT
Start: 2020-06-02 | End: 2020-06-02 | Stop reason: HOSPADM

## 2020-06-02 RX ORDER — ONDANSETRON 4 MG/1
4 TABLET, FILM COATED ORAL ONCE AS NEEDED
Status: DISCONTINUED | OUTPATIENT
Start: 2020-06-02 | End: 2020-06-02 | Stop reason: HOSPADM

## 2020-06-02 RX ADMIN — IOPAMIDOL 15 ML: 612 INJECTION, SOLUTION INTRATHECAL at 07:17

## 2020-06-02 RX ADMIN — LIDOCAINE HYDROCHLORIDE 10 ML: 10 INJECTION, SOLUTION INFILTRATION; PERINEURAL at 07:05

## 2020-06-02 NOTE — POST-PROCEDURE NOTE
MYELOGRAM PROCEDURE NOTE  Neurosurgery    Patient: Luis Crenshaw  : 1955      PreOp Diagnosis: Cervical radiculopathy    PostOp Diagnosis: Same    Procedure: Cervical myelogram    Surgeon: Waldemar    Anesthesia: 1% lidocaine    Technique:   Spinal needle: 22 gauge   Contrast: Isovue Isovue 300 (15ml)   Injection site: Midline L3-4    EBL: Trace    Specimens removed: None    Complication: None        Zander Medeiros MD  20  07:10

## 2020-06-03 ENCOUNTER — TELEPHONE (OUTPATIENT)
Dept: INFUSION THERAPY | Facility: HOSPITAL | Age: 65
End: 2020-06-03

## 2020-06-03 ENCOUNTER — OFFICE VISIT (OUTPATIENT)
Dept: NEUROSURGERY | Facility: CLINIC | Age: 65
End: 2020-06-03

## 2020-06-03 VITALS — BODY MASS INDEX: 33.5 KG/M2 | WEIGHT: 234 LBS | HEIGHT: 70 IN | TEMPERATURE: 97.9 F

## 2020-06-03 DIAGNOSIS — G54.0 BRACHIAL PLEXITIS: Primary | ICD-10-CM

## 2020-06-03 DIAGNOSIS — M54.12 CERVICAL RADICULOPATHY: ICD-10-CM

## 2020-06-03 DIAGNOSIS — R29.898 LEFT ARM WEAKNESS: ICD-10-CM

## 2020-06-03 PROCEDURE — 99213 OFFICE O/P EST LOW 20 MIN: CPT | Performed by: NEUROLOGICAL SURGERY

## 2020-06-03 RX ORDER — GABAPENTIN 300 MG/1
CAPSULE ORAL
COMMUNITY
Start: 2020-05-19

## 2020-06-03 RX ORDER — LISINOPRIL 20 MG/1
TABLET ORAL
COMMUNITY
Start: 2020-04-11

## 2020-06-03 RX ORDER — IBUPROFEN 800 MG/1
TABLET ORAL
COMMUNITY
Start: 2020-04-11

## 2020-06-03 NOTE — PROGRESS NOTES
Patient: Luis Crenshaw  : 1955    Primary Care Provider: Nisha Houston APRN    Requesting Provider: As above        History    Chief Complaint: Left upper extremity weakness.    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  A couple of years ago he underwent ACDF at C4-5 given some right upper extremity weakness.  He did not follow-up.  He now complains of a 5 to 6-month history of left upper extremity weakness.  This has been progressive and did not occur suddenly.  Early on he did have some right arm pain but that has gone away.  He has some neck pain.  He complains of some numbness in the left upper arm.  He reports no radicular left arm pain.    Review of Systems   Constitutional: Negative for activity change, appetite change, chills, diaphoresis, fatigue, fever and unexpected weight change.   HENT: Negative for congestion, dental problem, drooling, ear discharge, ear pain, facial swelling, hearing loss, mouth sores, nosebleeds, postnasal drip, rhinorrhea, sinus pressure, sinus pain, sneezing, sore throat, tinnitus, trouble swallowing and voice change.    Eyes: Negative for photophobia, pain, discharge, redness, itching and visual disturbance.   Respiratory: Negative for apnea, cough, choking, chest tightness, shortness of breath, wheezing and stridor.    Cardiovascular: Negative for chest pain, palpitations and leg swelling.   Gastrointestinal: Negative for abdominal distention, abdominal pain, anal bleeding, blood in stool, constipation, diarrhea, nausea, rectal pain and vomiting.   Endocrine: Negative for cold intolerance, heat intolerance, polydipsia, polyphagia and polyuria.   Genitourinary: Negative for decreased urine volume, difficulty urinating, discharge, dysuria, enuresis, flank pain, frequency, genital sores, hematuria, penile pain, penile swelling, scrotal swelling, testicular pain and urgency.   Musculoskeletal: Negative for  "arthralgias, back pain, gait problem, joint swelling, myalgias, neck pain and neck stiffness.   Skin: Negative for color change, pallor, rash and wound.   Allergic/Immunologic: Negative for environmental allergies, food allergies and immunocompromised state.   Neurological: Negative for dizziness, tremors, seizures, syncope, facial asymmetry, speech difficulty, weakness, light-headedness, numbness and headaches.   Hematological: Negative for adenopathy. Does not bruise/bleed easily.   Psychiatric/Behavioral: Negative for agitation, behavioral problems, confusion, decreased concentration, dysphoric mood, hallucinations, self-injury, sleep disturbance and suicidal ideas. The patient is not nervous/anxious and is not hyperactive.        The patient's past medical history, past surgical history, family history, and social history have been reviewed at length in the electronic medical record.    Physical Exam:   Temp 97.9 °F (36.6 °C) (Temporal)   Ht 177.8 cm (70\")   Wt 106 kg (234 lb)   BMI 33.58 kg/m²   Patient appears fairly comfortable.  Range of motion in his neck is preserved.  Left deltoid and bicep strength is 4-/5.    Medical Decision Making    Data Review:   MRI of the cervical spine demonstrates some changes related to prior C4-5 fusion.  There is some uncinate overgrowth more leftward at C5-6.  There is some broad-based burring at C3-4.     Electrodiagnostic studies suggest mild left carpal tunnel syndrome as well as a chronic upper trunk brachial plexopathy versus a C5/6 radiculopathy which was less likely.     MRI of the brachial plexus demonstrated no abnormality of the brachial plexus itself.  Muscle atrophy involving some of the shoulder muscles was identified consistent with something such as Parsonage-Avery syndrome.    CT myelogram demonstrates his previous construct to be intact at C4-5.  There is some broad-based spurring and narrows the recesses at C5-6 and C6-7.    Diagnosis:   The patient is " not really having radicular pain down the constellation of study findings in my mind suggests something such as Parsonage-Avery syndrome.    Treatment Options:   I have recommended aggressive physical therapy.  I do not think that intervention at the spondylosis below his operative site is going to be productive.  He will follow-up as needed.       Diagnosis Plan   1. Brachial plexitis  Ambulatory Referral to Physical Therapy Evaluate and treat; Stretching, ROM, Strengthening   2. Cervical radiculopathy     3. Left arm weakness         Scribed for Zander Medeiros MD by Zayra Natarajan CMA on 06/03/2020 at 10:08 AM      I, Dr. Medeiros, personally performed the services described in the documentation, as scribed in my presence, and it is both accurate and complete.

## 2020-07-30 ENCOUNTER — HOSPITAL ENCOUNTER (OUTPATIENT)
Facility: HOSPITAL | Age: 65
Discharge: HOME | End: 2020-07-30
Payer: MEDICARE

## 2020-07-30 ENCOUNTER — HOSPITAL ENCOUNTER (OUTPATIENT)
Dept: GENERAL RADIOLOGY | Facility: HOSPITAL | Age: 65
Discharge: HOME | End: 2020-07-30
Payer: MEDICARE

## 2020-07-30 PROCEDURE — 73502 X-RAY EXAM HIP UNI 2-3 VIEWS: CPT

## 2020-07-30 PROCEDURE — 73562 X-RAY EXAM OF KNEE 3: CPT

## 2020-12-28 ENCOUNTER — HOSPITAL ENCOUNTER (OUTPATIENT)
Facility: HOSPITAL | Age: 65
Discharge: HOME | End: 2020-12-28
Payer: MEDICARE

## 2020-12-28 ENCOUNTER — HOSPITAL ENCOUNTER (OUTPATIENT)
Dept: GENERAL RADIOLOGY | Facility: HOSPITAL | Age: 65
Discharge: HOME | End: 2020-12-28
Payer: MEDICARE

## 2020-12-28 PROCEDURE — 73030 X-RAY EXAM OF SHOULDER: CPT

## 2021-05-27 ENCOUNTER — HOSPITAL ENCOUNTER (OUTPATIENT)
Facility: HOSPITAL | Age: 66
Discharge: HOME | End: 2021-05-27
Payer: MEDICARE

## 2021-05-27 LAB
BASOPHILS ABSOLUTE: 0 K/UL (ref 0–0.1)
BASOPHILS RELATIVE PERCENT: 0.7 %
EOSINOPHILS ABSOLUTE: 0.1 K/UL (ref 0–0.4)
EOSINOPHILS RELATIVE PERCENT: 2.4 %
HCT VFR BLD CALC: 46.1 % (ref 40–54)
HEMOGLOBIN: 15.4 G/DL (ref 13–18)
IMMATURE GRANULOCYTES #: 0 K/UL
IMMATURE GRANULOCYTES %: 0.3 % (ref 0–5)
LYMPHOCYTES ABSOLUTE: 1.6 K/UL (ref 1.5–4)
LYMPHOCYTES RELATIVE PERCENT: 28.1 %
MCH RBC QN AUTO: 33.5 PG (ref 27–32)
MCHC RBC AUTO-ENTMCNC: 33.4 G/DL (ref 31–35)
MCV RBC AUTO: 100.2 FL (ref 80–100)
MONOCYTES ABSOLUTE: 0.7 K/UL (ref 0.2–0.8)
MONOCYTES RELATIVE PERCENT: 11.4 %
NEUTROPHILS ABSOLUTE: 3.3 K/UL (ref 2–7.5)
NEUTROPHILS RELATIVE PERCENT: 57.1 %
PDW BLD-RTO: 12.5 % (ref 11–16)
PLATELET # BLD: 153 K/UL (ref 150–400)
PMV BLD AUTO: 10.3 FL (ref 6–10)
RBC # BLD: 4.6 M/UL (ref 4.5–6)
WBC # BLD: 5.8 K/UL (ref 4–11)

## 2021-05-27 PROCEDURE — 85025 COMPLETE CBC W/AUTO DIFF WBC: CPT

## 2021-05-27 PROCEDURE — 36415 COLL VENOUS BLD VENIPUNCTURE: CPT

## 2021-07-06 ENCOUNTER — HOSPITAL ENCOUNTER (OUTPATIENT)
Dept: GENERAL RADIOLOGY | Facility: HOSPITAL | Age: 66
Discharge: HOME | End: 2021-07-06
Payer: MEDICARE

## 2021-07-06 ENCOUNTER — HOSPITAL ENCOUNTER (OUTPATIENT)
Facility: HOSPITAL | Age: 66
Discharge: HOME | End: 2021-07-06
Payer: MEDICARE

## 2021-07-06 DIAGNOSIS — M89.8X5 PAIN IN RIGHT FEMUR: ICD-10-CM

## 2021-07-06 DIAGNOSIS — M25.561 RIGHT KNEE PAIN, UNSPECIFIED CHRONICITY: ICD-10-CM

## 2021-07-06 DIAGNOSIS — R29.898 SHOULDER WEAKNESS: ICD-10-CM

## 2021-07-06 PROCEDURE — 73560 X-RAY EXAM OF KNEE 1 OR 2: CPT

## 2021-07-06 PROCEDURE — 73552 X-RAY EXAM OF FEMUR 2/>: CPT

## 2021-07-06 PROCEDURE — 73030 X-RAY EXAM OF SHOULDER: CPT

## 2021-12-21 ENCOUNTER — HOSPITAL ENCOUNTER (OUTPATIENT)
Facility: HOSPITAL | Age: 66
Discharge: HOME | End: 2021-12-21
Payer: MEDICARE

## 2021-12-21 ENCOUNTER — HOSPITAL ENCOUNTER (OUTPATIENT)
Dept: GENERAL RADIOLOGY | Facility: HOSPITAL | Age: 66
Discharge: HOME | End: 2021-12-21
Payer: MEDICARE

## 2021-12-21 DIAGNOSIS — M25.512 LEFT SHOULDER PAIN, UNSPECIFIED CHRONICITY: ICD-10-CM

## 2021-12-21 DIAGNOSIS — M54.2 NECK PAIN: ICD-10-CM

## 2021-12-21 PROCEDURE — 73030 X-RAY EXAM OF SHOULDER: CPT

## 2021-12-21 PROCEDURE — 72040 X-RAY EXAM NECK SPINE 2-3 VW: CPT

## 2022-06-28 ENCOUNTER — OFFICE VISIT (OUTPATIENT)
Dept: NEUROSURGERY | Facility: CLINIC | Age: 67
End: 2022-06-28

## 2022-06-28 VITALS — BODY MASS INDEX: 33.64 KG/M2 | TEMPERATURE: 98.4 F | WEIGHT: 235 LBS | HEIGHT: 70 IN

## 2022-06-28 DIAGNOSIS — R29.898 LEFT ARM WEAKNESS: Primary | ICD-10-CM

## 2022-06-28 PROCEDURE — 99214 OFFICE O/P EST MOD 30 MIN: CPT | Performed by: NEUROLOGICAL SURGERY

## 2022-06-28 RX ORDER — CYCLOBENZAPRINE HCL 10 MG
10 TABLET ORAL 3 TIMES DAILY PRN
COMMUNITY

## 2022-06-28 NOTE — PROGRESS NOTES
Patient: Luis Crenshaw  : 1955    Primary Care Provider: Nisha Houston APRN    Requesting Provider: Dr. Anton Avendaño        History    Chief Complaint: Left upper extremity weakness with sensory alteration and pain.    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  I last saw him in 2020 with proximal left upper extremity weakness.  At that time he was given a diagnosis of Parsonage-Avery syndrome.  At that time he had presented with a 6-month history of left upper extremity weakness.  This had occurred insidiously over time.  Early on he had had arm pain but that had gone away.  He had modest neck pain.  Number of years ago he had undergone ACDF at C4-5 for some right upper extremity weakness.  His weakness remains unchanged.  He has difficulty dressing himself given his weakness and shoulder abduction.  For a while he had some vague pain involving his left arm but that went away after cortisone injection in his shoulder.  Intermittently has some numbness in his arm as well.    Review of Systems   Constitutional: Positive for fatigue. Negative for activity change, appetite change, chills, diaphoresis, fever and unexpected weight change.   HENT: Positive for tinnitus. Negative for congestion, dental problem, drooling, ear discharge, ear pain, facial swelling, hearing loss, mouth sores, nosebleeds, postnasal drip, rhinorrhea, sinus pressure, sneezing, sore throat, trouble swallowing and voice change.    Eyes: Positive for itching. Negative for photophobia, pain, discharge, redness and visual disturbance.   Respiratory: Positive for cough. Negative for apnea, choking, chest tightness, shortness of breath, wheezing and stridor.    Cardiovascular: Negative for chest pain, palpitations and leg swelling.   Gastrointestinal: Negative for abdominal distention, abdominal pain, anal bleeding, blood in stool, constipation, diarrhea, nausea, rectal  "pain and vomiting.   Endocrine: Negative for cold intolerance, heat intolerance, polydipsia, polyphagia and polyuria.   Genitourinary: Positive for frequency. Negative for decreased urine volume, difficulty urinating, dysuria, enuresis, flank pain, genital sores, hematuria and urgency.   Musculoskeletal: Positive for back pain and myalgias. Negative for arthralgias, gait problem, joint swelling, neck pain and neck stiffness.   Skin: Negative for color change, pallor, rash and wound.   Allergic/Immunologic: Negative for environmental allergies, food allergies and immunocompromised state.   Neurological: Positive for weakness. Negative for dizziness, tremors, seizures, syncope, facial asymmetry, speech difficulty, light-headedness, numbness and headaches.   Hematological: Negative for adenopathy. Does not bruise/bleed easily.   Psychiatric/Behavioral: Positive for sleep disturbance. Negative for agitation, behavioral problems, confusion, decreased concentration, dysphoric mood, hallucinations, self-injury and suicidal ideas. The patient is not nervous/anxious and is not hyperactive.    All other systems reviewed and are negative.      The patient's past medical history, past surgical history, family history, and social history have been reviewed at length in the electronic medical record.      Physical Exam:   Temp 98.4 °F (36.9 °C) (Infrared)   Ht 177.8 cm (70\")   Wt 107 kg (235 lb)   BMI 33.72 kg/m²   MUSCULOSKELETAL:  Neck tenderness to palpation is not observed.   ROM in the neck is normal.  NEUROLOGICAL:  Strength is intact in the upper and lower extremities to direct testing except for left shoulder abduction which is 2/5 and left bicep function which is 4/5.  Muscle tone is normal throughout.  Station and gait are normal.  Sensation is intact to light touch testing throughout.  Deep tendon reflexes are 1+ and symmetrical.  Selene's Sign is negative bilaterally.         Medical Decision Making    Data " Review:   (All imaging studies were personally reviewed unless stated otherwise)  Current MRI study is a very limited quality study.  The sagittals are useful.  The axials are not.  There is some postsurgical change as expected at C4-5.  There are some broad-based spurring at C5-6.  No cord compression is identified.    His most recent electrodiagnostic studies dated 3/15/2022 were performed by physical therapist.  There is said to be moderate right carpal tunnel syndrome, mild left carpal tunnel syndrome, moderate right ulnar neuropathy at or above the elbow, mild left ulnar neuropathy at or above the elbow, and findings suggestive of a healed or healing cervical root pathology affecting the right C7 and left C5 nerve roots.    Prior studies:  MRI of the cervical spine demonstrates some changes related to prior C4-5 fusion.  There is some uncinate overgrowth more leftward at C5-6.  There is some broad-based burring at C3-4.     Electrodiagnostic studies suggest mild left carpal tunnel syndrome as well as a chronic upper trunk brachial plexopathy versus a C5/6 radiculopathy which was less likely.     MRI of the brachial plexus demonstrated no abnormality of the brachial plexus itself.  Muscle atrophy involving some of the shoulder muscles was identified consistent with something such as Parsonage-Avery syndrome.     CT myelogram demonstrates his previous construct to be intact at C4-5.  There is some broad-based spurring and narrows the recesses at C5-6 and C6-7.    Diagnosis:   1.  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  2.  Low-grade cervical radiculopathy.    Treatment Options:   The patient's main complaint is weakness in his proximal left upper extremity.  There is no correlate on his cervical MRI that would explain this weakness and once again he is felt to have difficulties related to a brachial plexitis.  Currently there is no role for surgical intervention and what has become a  very chronic problem.       Diagnosis Plan   1. Left arm weakness         Scribed for Zander Medeiros MD by Maria Rubi CMA on 6/28/2022 13:51 EDT       I, Dr. Medeiros, personally performed the services described in the documentation, as scribed in my presence, and it is both accurate and complete.

## 2023-06-21 ENCOUNTER — TRANSCRIBE ORDERS (OUTPATIENT)
Dept: ADMINISTRATIVE | Age: 68
End: 2023-06-21

## 2023-06-21 DIAGNOSIS — D69.6 THROMBOCYTOPENIA (HCC): Primary | ICD-10-CM

## 2023-06-28 ENCOUNTER — HOSPITAL ENCOUNTER (OUTPATIENT)
Dept: ULTRASOUND IMAGING | Facility: HOSPITAL | Age: 68
Discharge: HOME | End: 2023-06-28
Payer: MEDICARE

## 2023-06-28 ENCOUNTER — HOSPITAL ENCOUNTER (OUTPATIENT)
Dept: CT IMAGING | Facility: HOSPITAL | Age: 68
Discharge: HOME | End: 2023-06-28
Payer: MEDICARE

## 2023-06-28 DIAGNOSIS — D69.6 THROMBOCYTOPENIA (HCC): ICD-10-CM

## 2023-06-28 DIAGNOSIS — F17.210 CIGARETTE SMOKER: ICD-10-CM

## 2023-06-28 PROCEDURE — 76705 ECHO EXAM OF ABDOMEN: CPT

## 2023-06-28 PROCEDURE — 71271 CT THORAX LUNG CANCER SCR C-: CPT

## 2023-07-14 ENCOUNTER — HOSPITAL ENCOUNTER (OUTPATIENT)
Facility: HOSPITAL | Age: 68
Discharge: HOME | End: 2023-07-14
Payer: MEDICARE

## 2023-07-14 ENCOUNTER — HOSPITAL ENCOUNTER (OUTPATIENT)
Dept: GENERAL RADIOLOGY | Facility: HOSPITAL | Age: 68
Discharge: HOME | End: 2023-07-14
Payer: MEDICARE

## 2023-07-14 DIAGNOSIS — M25.511 RIGHT SHOULDER PAIN, UNSPECIFIED CHRONICITY: ICD-10-CM

## 2023-07-14 PROCEDURE — 73030 X-RAY EXAM OF SHOULDER: CPT

## 2024-07-05 ENCOUNTER — TRANSCRIBE ORDERS (OUTPATIENT)
Dept: ADMINISTRATIVE | Age: 69
End: 2024-07-05

## 2024-07-05 DIAGNOSIS — F17.210 CIGARETTE SMOKER: Primary | ICD-10-CM

## 2024-07-12 ENCOUNTER — HOSPITAL ENCOUNTER (OUTPATIENT)
Dept: CT IMAGING | Facility: HOSPITAL | Age: 69
Discharge: HOME | End: 2024-07-12

## 2024-07-12 DIAGNOSIS — F17.210 CIGARETTE SMOKER: ICD-10-CM

## 2024-07-22 ENCOUNTER — HOSPITAL ENCOUNTER (OUTPATIENT)
Dept: CT IMAGING | Facility: HOSPITAL | Age: 69
Discharge: HOME | End: 2024-07-22
Payer: MEDICARE

## 2024-07-22 PROCEDURE — 71271 CT THORAX LUNG CANCER SCR C-: CPT

## 2024-10-24 ENCOUNTER — APPOINTMENT (OUTPATIENT)
Dept: GENERAL RADIOLOGY | Facility: HOSPITAL | Age: 69
End: 2024-10-24
Payer: MEDICARE

## 2024-10-24 ENCOUNTER — HOSPITAL ENCOUNTER (INPATIENT)
Facility: HOSPITAL | Age: 69
LOS: 2 days | Discharge: HOME | End: 2024-10-26
Attending: INTERNAL MEDICINE | Admitting: INTERNAL MEDICINE
Payer: MEDICARE

## 2024-10-24 ENCOUNTER — HOSPITAL ENCOUNTER (EMERGENCY)
Facility: HOSPITAL | Age: 69
Discharge: ANOTHER HOSPITAL | End: 2024-10-24
Attending: HOSPITALIST
Payer: MEDICARE

## 2024-10-24 VITALS
HEART RATE: 78 BPM | RESPIRATION RATE: 17 BRPM | DIASTOLIC BLOOD PRESSURE: 96 MMHG | WEIGHT: 240 LBS | OXYGEN SATURATION: 99 % | SYSTOLIC BLOOD PRESSURE: 190 MMHG | TEMPERATURE: 98 F | BODY MASS INDEX: 33.47 KG/M2

## 2024-10-24 DIAGNOSIS — I21.3 ST ELEVATION MYOCARDIAL INFARCTION (STEMI), UNSPECIFIED ARTERY (HCC): Primary | ICD-10-CM

## 2024-10-24 DIAGNOSIS — M48.02 FORAMINAL STENOSIS OF CERVICAL REGION: ICD-10-CM

## 2024-10-24 DIAGNOSIS — I21.09 STEMI INVOLVING OTH CORONARY ARTERY OF ANTERIOR WALL: Primary | ICD-10-CM

## 2024-10-24 LAB
ALBUMIN SERPL-MCNC: 4 G/DL (ref 3.4–4.8)
ALBUMIN/GLOB SERPL: 1.1 {RATIO} (ref 0.8–2)
ALP SERPL-CCNC: 144 U/L (ref 25–100)
ALT SERPL-CCNC: 20 U/L (ref 4–36)
ANION GAP SERPL CALCULATED.3IONS-SCNC: 10 MMOL/L (ref 3–16)
AST SERPL-CCNC: 23 U/L (ref 8–33)
BASOPHILS # BLD: 0 K/UL (ref 0–0.1)
BASOPHILS NFR BLD: 0.6 %
BILIRUB SERPL-MCNC: 0.7 MG/DL (ref 0.3–1.2)
BUN BLDA-MCNC: 14 MG/DL (ref 8–26)
BUN SERPL-MCNC: 13 MG/DL (ref 6–20)
CA-I BLDA-SCNC: 1.2 MMOL/L (ref 1.2–1.32)
CALCIUM SERPL-MCNC: 9.6 MG/DL (ref 8.5–10.5)
CATH EF ESTIMATED: 60 %
CHLORIDE BLDA-SCNC: 99 MMOL/L (ref 98–109)
CHLORIDE SERPL-SCNC: 97 MMOL/L (ref 98–107)
CO2 BLDA-SCNC: 27 MMOL/L (ref 24–29)
CO2 SERPL-SCNC: 28 MMOL/L (ref 20–30)
CREAT BLDA-MCNC: 0.8 MG/DL (ref 0.6–1.3)
CREAT SERPL-MCNC: 0.8 MG/DL (ref 0.4–1.2)
EGFRCR SERPLBLD CKD-EPI 2021: 95.2 ML/MIN/1.73
EOSINOPHIL # BLD: 0.1 K/UL (ref 0–0.4)
EOSINOPHIL NFR BLD: 2.2 %
ERYTHROCYTE [DISTWIDTH] IN BLOOD BY AUTOMATED COUNT: 12.4 % (ref 11–16)
GFR SERPLBLD CREATININE-BSD FMLA CKD-EPI: >90 ML/MIN/{1.73_M2}
GLOBULIN SER CALC-MCNC: 3.5 G/DL
GLUCOSE BLDC GLUCOMTR-MCNC: 101 MG/DL (ref 70–130)
GLUCOSE SERPL-MCNC: 118 MG/DL (ref 74–106)
HBA1C MFR BLD: 5.6 % (ref 4.8–5.6)
HCT VFR BLD AUTO: 49.6 % (ref 40–54)
HCT VFR BLDA CALC: 47 % (ref 38–51)
HGB BLD-MCNC: 17 G/DL (ref 13–18)
HGB BLDA-MCNC: 16 G/DL (ref 12–17)
IMM GRANULOCYTES # BLD: 0 K/UL
IMM GRANULOCYTES NFR BLD: 0.2 % (ref 0–5)
LYMPHOCYTES # BLD: 1.4 K/UL (ref 1.5–4)
LYMPHOCYTES NFR BLD: 27.5 %
MAGNESIUM SERPL-MCNC: 1.9 MG/DL (ref 1.6–2.4)
MAGNESIUM SERPL-MCNC: 1.9 MG/DL (ref 1.7–2.4)
MCH RBC QN AUTO: 32.6 PG (ref 27–32)
MCHC RBC AUTO-ENTMCNC: 34.3 G/DL (ref 31–35)
MCV RBC AUTO: 95.2 FL (ref 80–100)
MONOCYTES # BLD: 0.5 K/UL (ref 0.2–0.8)
MONOCYTES NFR BLD: 10.7 %
NEUTROPHILS # BLD: 2.9 K/UL (ref 2–7.5)
NEUTS SEG NFR BLD: 58.8 %
PLATELET # BLD AUTO: 137 K/UL (ref 150–400)
PMV BLD AUTO: 11.2 FL (ref 6–10)
POTASSIUM BLDA-SCNC: 4.3 MMOL/L (ref 3.5–4.9)
POTASSIUM SERPL-SCNC: 3.5 MMOL/L (ref 3.4–5.1)
PROT SERPL-MCNC: 7.5 G/DL (ref 6.4–8.3)
QT INTERVAL: 430 MS
QT INTERVAL: 456 MS
QTC INTERVAL: 422 MS
QTC INTERVAL: 456 MS
RBC # BLD AUTO: 5.21 M/UL (ref 4.5–6)
SODIUM BLD-SCNC: 137 MMOL/L (ref 138–146)
SODIUM SERPL-SCNC: 135 MMOL/L (ref 136–145)
TROPONIN, HIGH SENSITIVITY: 25 NG/L (ref 0–22)
TSH SERPL DL<=0.05 MIU/L-ACNC: 2.2 UIU/ML (ref 0.27–4.2)
WBC # BLD AUTO: 5 K/UL (ref 4–11)

## 2024-10-24 PROCEDURE — 25510000001 IOPAMIDOL PER 1 ML: Performed by: INTERNAL MEDICINE

## 2024-10-24 PROCEDURE — 25010000002 BIVALIRUDIN TRIFLUOROACETATE 250 MG RECONSTITUTED SOLUTION 1 EACH VIAL: Performed by: INTERNAL MEDICINE

## 2024-10-24 PROCEDURE — 99285 EMERGENCY DEPT VISIT HI MDM: CPT

## 2024-10-24 PROCEDURE — 80047 BASIC METABLC PNL IONIZED CA: CPT

## 2024-10-24 PROCEDURE — 84443 ASSAY THYROID STIM HORMONE: CPT | Performed by: INTERNAL MEDICINE

## 2024-10-24 PROCEDURE — 4A023N7 MEASUREMENT OF CARDIAC SAMPLING AND PRESSURE, LEFT HEART, PERCUTANEOUS APPROACH: ICD-10-PCS | Performed by: INTERNAL MEDICINE

## 2024-10-24 PROCEDURE — 25010000002 FENTANYL CITRATE (PF) 50 MCG/ML SOLUTION: Performed by: INTERNAL MEDICINE

## 2024-10-24 PROCEDURE — B2151ZZ FLUOROSCOPY OF LEFT HEART USING LOW OSMOLAR CONTRAST: ICD-10-PCS | Performed by: INTERNAL MEDICINE

## 2024-10-24 PROCEDURE — 85025 COMPLETE CBC W/AUTO DIFF WBC: CPT

## 2024-10-24 PROCEDURE — 36415 COLL VENOUS BLD VENIPUNCTURE: CPT

## 2024-10-24 PROCEDURE — 25010000002 MIDAZOLAM PER 1 MG: Performed by: INTERNAL MEDICINE

## 2024-10-24 PROCEDURE — 99222 1ST HOSP IP/OBS MODERATE 55: CPT | Performed by: INTERNAL MEDICINE

## 2024-10-24 PROCEDURE — 25810000003 SODIUM CHLORIDE 0.9 % SOLUTION: Performed by: INTERNAL MEDICINE

## 2024-10-24 PROCEDURE — C1725 CATH, TRANSLUMIN NON-LASER: HCPCS | Performed by: INTERNAL MEDICINE

## 2024-10-24 PROCEDURE — 93458 L HRT ARTERY/VENTRICLE ANGIO: CPT | Performed by: INTERNAL MEDICINE

## 2024-10-24 PROCEDURE — C1769 GUIDE WIRE: HCPCS | Performed by: INTERNAL MEDICINE

## 2024-10-24 PROCEDURE — C1894 INTRO/SHEATH, NON-LASER: HCPCS | Performed by: INTERNAL MEDICINE

## 2024-10-24 PROCEDURE — 83036 HEMOGLOBIN GLYCOSYLATED A1C: CPT | Performed by: INTERNAL MEDICINE

## 2024-10-24 PROCEDURE — 93005 ELECTROCARDIOGRAM TRACING: CPT | Performed by: INTERNAL MEDICINE

## 2024-10-24 PROCEDURE — C9606 PERC D-E COR REVASC W AMI S: HCPCS | Performed by: INTERNAL MEDICINE

## 2024-10-24 PROCEDURE — 85014 HEMATOCRIT: CPT

## 2024-10-24 PROCEDURE — 25010000002 BIVALIRUDIN TRIFLUOROACETATE 250 MG RECONSTITUTED SOLUTION: Performed by: INTERNAL MEDICINE

## 2024-10-24 PROCEDURE — 96374 THER/PROPH/DIAG INJ IV PUSH: CPT

## 2024-10-24 PROCEDURE — 84484 ASSAY OF TROPONIN QUANT: CPT

## 2024-10-24 PROCEDURE — 83735 ASSAY OF MAGNESIUM: CPT | Performed by: INTERNAL MEDICINE

## 2024-10-24 PROCEDURE — 6360000002 HC RX W HCPCS: Performed by: HOSPITALIST

## 2024-10-24 PROCEDURE — 6370000000 HC RX 637 (ALT 250 FOR IP): Performed by: HOSPITALIST

## 2024-10-24 PROCEDURE — C1887 CATHETER, GUIDING: HCPCS | Performed by: INTERNAL MEDICINE

## 2024-10-24 PROCEDURE — C1874 STENT, COATED/COV W/DEL SYS: HCPCS | Performed by: INTERNAL MEDICINE

## 2024-10-24 PROCEDURE — 83735 ASSAY OF MAGNESIUM: CPT

## 2024-10-24 PROCEDURE — 80053 COMPREHEN METABOLIC PANEL: CPT

## 2024-10-24 PROCEDURE — 25010000002 HEPARIN (PORCINE) PER 1000 UNITS: Performed by: INTERNAL MEDICINE

## 2024-10-24 PROCEDURE — 92941 PRQ TRLML REVSC TOT OCCL AMI: CPT | Performed by: INTERNAL MEDICINE

## 2024-10-24 PROCEDURE — 25010000002 NICARDIPINE 2.5 MG/ML SOLUTION: Performed by: INTERNAL MEDICINE

## 2024-10-24 PROCEDURE — 25010000002 LIDOCAINE PF 1% 1 % SOLUTION: Performed by: INTERNAL MEDICINE

## 2024-10-24 PROCEDURE — 027034Z DILATION OF CORONARY ARTERY, ONE ARTERY WITH DRUG-ELUTING INTRALUMINAL DEVICE, PERCUTANEOUS APPROACH: ICD-10-PCS | Performed by: INTERNAL MEDICINE

## 2024-10-24 PROCEDURE — B2111ZZ FLUOROSCOPY OF MULTIPLE CORONARY ARTERIES USING LOW OSMOLAR CONTRAST: ICD-10-PCS | Performed by: INTERNAL MEDICINE

## 2024-10-24 PROCEDURE — 71045 X-RAY EXAM CHEST 1 VIEW: CPT

## 2024-10-24 DEVICE — XIENCE SKYPOINT™ EVEROLIMUS ELUTING CORONARY STENT SYSTEM 2.75 MM X 15 MM / RAPID-EXCHANGE
Type: IMPLANTABLE DEVICE | Site: HEART | Status: FUNCTIONAL
Brand: XIENCE SKYPOINT™

## 2024-10-24 RX ORDER — POLYETHYLENE GLYCOL 3350 17 G/17G
17 POWDER, FOR SOLUTION ORAL DAILY PRN
Status: DISCONTINUED | OUTPATIENT
Start: 2024-10-24 | End: 2024-10-26 | Stop reason: HOSPADM

## 2024-10-24 RX ORDER — HYDROCODONE BITARTRATE AND ACETAMINOPHEN 5; 325 MG/1; MG/1
1 TABLET ORAL EVERY 4 HOURS PRN
Status: DISCONTINUED | OUTPATIENT
Start: 2024-10-24 | End: 2024-10-26 | Stop reason: HOSPADM

## 2024-10-24 RX ORDER — METOPROLOL TARTRATE 25 MG/1
25 TABLET, FILM COATED ORAL EVERY 12 HOURS SCHEDULED
Status: DISCONTINUED | OUTPATIENT
Start: 2024-10-24 | End: 2024-10-26 | Stop reason: HOSPADM

## 2024-10-24 RX ORDER — NITROGLYCERIN 0.4 MG/1
0.4 TABLET SUBLINGUAL EVERY 5 MIN PRN
Status: DISCONTINUED | OUTPATIENT
Start: 2024-10-24 | End: 2024-10-24 | Stop reason: HOSPADM

## 2024-10-24 RX ORDER — ASPIRIN 325 MG
325 TABLET ORAL DAILY
Status: DISCONTINUED | OUTPATIENT
Start: 2024-10-25 | End: 2024-10-26 | Stop reason: HOSPADM

## 2024-10-24 RX ORDER — HEPARIN SODIUM 1000 [USP'U]/ML
60 INJECTION, SOLUTION INTRAVENOUS; SUBCUTANEOUS ONCE
Status: COMPLETED | OUTPATIENT
Start: 2024-10-24 | End: 2024-10-24

## 2024-10-24 RX ORDER — NICOTINE 21 MG/24HR
1 PATCH, TRANSDERMAL 24 HOURS TRANSDERMAL EVERY 24 HOURS
Status: DISCONTINUED | OUTPATIENT
Start: 2024-10-24 | End: 2024-10-26 | Stop reason: HOSPADM

## 2024-10-24 RX ORDER — SODIUM CHLORIDE 0.9 % (FLUSH) 0.9 %
10 SYRINGE (ML) INJECTION AS NEEDED
Status: DISCONTINUED | OUTPATIENT
Start: 2024-10-24 | End: 2024-10-26 | Stop reason: HOSPADM

## 2024-10-24 RX ORDER — ASPIRIN 81 MG/1
324 TABLET, CHEWABLE ORAL ONCE
Status: COMPLETED | OUTPATIENT
Start: 2024-10-24 | End: 2024-10-24

## 2024-10-24 RX ORDER — NITROGLYCERIN 0.4 MG/1
0.4 TABLET SUBLINGUAL
Status: DISCONTINUED | OUTPATIENT
Start: 2024-10-24 | End: 2024-10-26 | Stop reason: HOSPADM

## 2024-10-24 RX ORDER — ACETAMINOPHEN 325 MG/1
650 TABLET ORAL EVERY 4 HOURS PRN
Status: DISCONTINUED | OUTPATIENT
Start: 2024-10-24 | End: 2024-10-26 | Stop reason: HOSPADM

## 2024-10-24 RX ORDER — IOPAMIDOL 755 MG/ML
INJECTION, SOLUTION INTRAVASCULAR
Status: DISCONTINUED | OUTPATIENT
Start: 2024-10-24 | End: 2024-10-24 | Stop reason: HOSPADM

## 2024-10-24 RX ORDER — ATORVASTATIN CALCIUM 40 MG/1
80 TABLET, FILM COATED ORAL NIGHTLY
Status: DISCONTINUED | OUTPATIENT
Start: 2024-10-24 | End: 2024-10-26 | Stop reason: HOSPADM

## 2024-10-24 RX ORDER — ONDANSETRON 2 MG/ML
4 INJECTION INTRAMUSCULAR; INTRAVENOUS EVERY 6 HOURS PRN
Status: DISCONTINUED | OUTPATIENT
Start: 2024-10-24 | End: 2024-10-26 | Stop reason: HOSPADM

## 2024-10-24 RX ORDER — CLOPIDOGREL BISULFATE 75 MG/1
600 TABLET ORAL ONCE
Status: COMPLETED | OUTPATIENT
Start: 2024-10-24 | End: 2024-10-24

## 2024-10-24 RX ORDER — HEPARIN SODIUM 5000 [USP'U]/ML
5000 INJECTION, SOLUTION INTRAVENOUS; SUBCUTANEOUS EVERY 12 HOURS SCHEDULED
Status: DISCONTINUED | OUTPATIENT
Start: 2024-10-25 | End: 2024-10-26 | Stop reason: HOSPADM

## 2024-10-24 RX ORDER — MIDAZOLAM HYDROCHLORIDE 1 MG/ML
INJECTION INTRAMUSCULAR; INTRAVENOUS
Status: DISCONTINUED | OUTPATIENT
Start: 2024-10-24 | End: 2024-10-24 | Stop reason: HOSPADM

## 2024-10-24 RX ORDER — SODIUM CHLORIDE 0.9 % (FLUSH) 0.9 %
10 SYRINGE (ML) INJECTION EVERY 12 HOURS SCHEDULED
Status: DISCONTINUED | OUTPATIENT
Start: 2024-10-24 | End: 2024-10-26 | Stop reason: HOSPADM

## 2024-10-24 RX ORDER — ACETAMINOPHEN 325 MG/1
650 TABLET ORAL EVERY 4 HOURS PRN
Status: DISCONTINUED | OUTPATIENT
Start: 2024-10-24 | End: 2024-10-24 | Stop reason: SDUPTHER

## 2024-10-24 RX ORDER — DIPHENHYDRAMINE HYDROCHLORIDE 50 MG/ML
25 INJECTION INTRAMUSCULAR; INTRAVENOUS EVERY 6 HOURS PRN
Status: DISCONTINUED | OUTPATIENT
Start: 2024-10-24 | End: 2024-10-26 | Stop reason: HOSPADM

## 2024-10-24 RX ORDER — LISINOPRIL 5 MG/1
5 TABLET ORAL DAILY
Status: DISCONTINUED | OUTPATIENT
Start: 2024-10-24 | End: 2024-10-26 | Stop reason: HOSPADM

## 2024-10-24 RX ORDER — BISACODYL 10 MG
10 SUPPOSITORY, RECTAL RECTAL DAILY PRN
Status: DISCONTINUED | OUTPATIENT
Start: 2024-10-24 | End: 2024-10-26 | Stop reason: HOSPADM

## 2024-10-24 RX ORDER — BIVALIRUDIN 250 MG/5ML
INJECTION, POWDER, LYOPHILIZED, FOR SOLUTION INTRAVENOUS
Status: DISCONTINUED | OUTPATIENT
Start: 2024-10-24 | End: 2024-10-24 | Stop reason: HOSPADM

## 2024-10-24 RX ORDER — HEPARIN SODIUM 1000 [USP'U]/ML
INJECTION, SOLUTION INTRAVENOUS; SUBCUTANEOUS
Status: DISCONTINUED | OUTPATIENT
Start: 2024-10-24 | End: 2024-10-24 | Stop reason: HOSPADM

## 2024-10-24 RX ORDER — CLOPIDOGREL BISULFATE 75 MG/1
75 TABLET ORAL DAILY
Status: DISCONTINUED | OUTPATIENT
Start: 2024-10-25 | End: 2024-10-26 | Stop reason: HOSPADM

## 2024-10-24 RX ORDER — FENTANYL CITRATE 50 UG/ML
INJECTION, SOLUTION INTRAMUSCULAR; INTRAVENOUS
Status: DISCONTINUED | OUTPATIENT
Start: 2024-10-24 | End: 2024-10-24 | Stop reason: HOSPADM

## 2024-10-24 RX ORDER — CALCIUM CARBONATE 500 MG/1
2 TABLET, CHEWABLE ORAL 2 TIMES DAILY PRN
Status: DISCONTINUED | OUTPATIENT
Start: 2024-10-24 | End: 2024-10-26 | Stop reason: HOSPADM

## 2024-10-24 RX ORDER — ALPRAZOLAM 0.25 MG
0.25 TABLET ORAL 3 TIMES DAILY PRN
Status: DISCONTINUED | OUTPATIENT
Start: 2024-10-24 | End: 2024-10-26 | Stop reason: HOSPADM

## 2024-10-24 RX ORDER — AMOXICILLIN 250 MG
2 CAPSULE ORAL 2 TIMES DAILY
Status: DISCONTINUED | OUTPATIENT
Start: 2024-10-24 | End: 2024-10-26 | Stop reason: HOSPADM

## 2024-10-24 RX ORDER — LIDOCAINE HYDROCHLORIDE 10 MG/ML
INJECTION, SOLUTION EPIDURAL; INFILTRATION; INTRACAUDAL; PERINEURAL
Status: DISCONTINUED | OUTPATIENT
Start: 2024-10-24 | End: 2024-10-24 | Stop reason: HOSPADM

## 2024-10-24 RX ORDER — BISACODYL 5 MG/1
5 TABLET, DELAYED RELEASE ORAL DAILY PRN
Status: DISCONTINUED | OUTPATIENT
Start: 2024-10-24 | End: 2024-10-26 | Stop reason: HOSPADM

## 2024-10-24 RX ADMIN — ASPIRIN 81 MG 324 MG: 81 TABLET ORAL at 07:59

## 2024-10-24 RX ADMIN — ATORVASTATIN CALCIUM 80 MG: 40 TABLET, FILM COATED ORAL at 21:10

## 2024-10-24 RX ADMIN — HEPARIN SODIUM 6500 UNITS: 1000 INJECTION INTRAVENOUS; SUBCUTANEOUS at 08:00

## 2024-10-24 RX ADMIN — CLOPIDOGREL BISULFATE 600 MG: 75 TABLET ORAL at 07:59

## 2024-10-24 RX ADMIN — Medication 10 ML: at 13:25

## 2024-10-24 RX ADMIN — HYDROCODONE BITARTRATE AND ACETAMINOPHEN 1 TABLET: 5; 325 TABLET ORAL at 21:11

## 2024-10-24 NOTE — Clinical Note
POC Creatinine = 0.8 (mg/dL). POC Creatinine was drawn at 09:18 EDT. POC Creatinine result was completed at 09:23 EDT.

## 2024-10-24 NOTE — PROGRESS NOTES
Patient presented to the ER with a Stemi reading on his EKG.    7:52 Called  [place] with Active Stemi, Placed on hold with Stemi team (Peri) contacting Dr Atwood. Lost connection.    8:04 Called back stated that I am not certain what happened but we have an active Stemi and Dr Lopez needs to talk to someone now.  I spoke with Aixa at this time and she stated that Peri is trying to contact Dr Atwood and he is not answering.    8:07- Stated that WE NEED TO SPEAK to someone who is covering if that Dr is unavailable. They are putting Dr Sanchez on the line now.  8:07 Dr Sanchez is speaking to Dr Lopez at this time.  I

## 2024-10-24 NOTE — CONSULTS
Diabetes Education    Patient Name:  Luis Crenshaw  YOB: 1955  MRN: [id number]  Admit Date:  10/24/2024        Order criteria not met for diabetes education consult. Though current A1c is not available, noted during chart review. Pt has no history of DM and no home meds for DM. Thank you. Should pt A1c result and pt need education, please re-consult.       Electronically signed by:  Abi Perea RN, [facility]  10/24/24 10:37 EDT

## 2024-10-24 NOTE — PROGRESS NOTES
Pt. Referred for Phase II Cardiac Rehab. Staff discussed benefits of exercise, program protocol, and educational material provided. Teach back verified.  Patient would like some time to think on participating in cardiac rehab at this time. Patient educated on AHA guidelines for home exercise and given home exercise sheet. Pt given brochure for [facility] if they have any further questions about cardiac rehab and instructed to contact their department if they would like to schedule. Patient followed.

## 2024-10-24 NOTE — ED NOTES
Pt arrived to ER, ambulated in, no distress noted.   Pt reports that around 0600, approx 2 hours PTA. Pt had onset of mid-sternal chest pain, non-radiating, constant, unchanged. Reports he thought is was heart burn, so he took tums, but after no improvement, came to ER.   Report dry heaving, nausea.   Rates pain 7/10.

## 2024-10-24 NOTE — H&P
[facility] at [facility]   History and physical      Patient Care Team:  Nisha Houston APRN as PCP - General (Family Medicine)  Nigel Mantilla MD as Referring Physician (Pain Medicine)  Edi Weller II, MD as Referring Physician (Pain Medicine)    Problem List:  Lateral STEMI  Hypertension  Anxiety/depression  Arthritis  Lumbar degenerative disc disease  Surgeries:  Cervical discectomy with fusion  Bilateral cataract  Femur fracture surgery        No Known Allergies        Current Facility-Administered Medications:     midazolam (VERSED) injection, , , Code / Trauma / Sedation Medication, Sourav Reyes MD, 2 mg at 10/24/24 0921    O2 (OXYGEN), , , Code / Trauma / Sedation Medication, Sourav Reyes MD, 2 L at 10/24/24 0919    Facility-Administered Medications Ordered in Other Encounters:     vancomycin 1500 mg/500 mL 0.9% NS IVPB ([facility]), 15 mg/kg, Intravenous, Once, Karina Osullivan PA-C           Medications Prior to Admission   Medication Sig Dispense Refill Last Dose/Taking    budesonide-formoterol (SYMBICORT) 160-4.5 MCG/ACT inhaler Inhale 2 puffs 2 (Two) Times a Day.       cyclobenzaprine (FLEXERIL) 10 MG tablet Take 10 mg by mouth 3 (Three) Times a Day As Needed for Muscle Spasms.       DULoxetine (CYMBALTA) 30 MG capsule        gabapentin (NEURONTIN) 300 MG capsule        HYDROcodone-acetaminophen (NORCO)  MG per tablet Take 1 tablet by mouth.       ibuprofen (ADVIL,MOTRIN) 800 MG tablet        lisinopril (PRINIVIL,ZESTRIL) 20 MG tablet             Subjective .   History of present illness:    Patient is a 69-year-old  male who is brought emergently to the cardiac catheterization lab secondary to lateral STEMI.  Patient has no previous history of coronary disease.  He has noted risk factors of high blood pressure, advanced age, and ongoing tobacco abuse.  Patient was in his usual state of health until about 2-1/2 hours prior to presentation.  Notes that he  awoke with what he thought was indigestion.  Also had associated nausea and dry heaves.  He presented to the outside facility and at that time was noted to have lateral ST elevation.  He was then brought emergently to [facility] for further invasive management.  On arrival to Cath Lab patient is pain-free.      Social History     Socioeconomic History    Marital status:    Tobacco Use    Smoking status: Every Day     Current packs/day: 2.00     Average packs/day: 2.0 packs/day for 40.0 years (80.0 ttl pk-yrs)     Types: Cigarettes    Smokeless tobacco: Never   Substance and Sexual Activity    Alcohol use: Yes     Alcohol/week: 8.0 standard drinks of alcohol     Types: 8 Cans of beer per week     Comment: drinks on occasion     Drug use: No    Sexual activity: Defer       Per patient family history negative for history of CAD.    Review of Systems:  Review of Systems   Constitutional: Negative for fever and malaise/fatigue.   HENT:  Negative for nosebleeds.    Eyes:  Negative for redness and visual disturbance.   Cardiovascular:  Positive for chest pain. Negative for orthopnea, palpitations and paroxysmal nocturnal dyspnea.   Respiratory:  Negative for cough, snoring, sputum production and wheezing.    Hematologic/Lymphatic: Negative for bleeding problem.   Skin:  Negative for flushing, itching and rash.   Musculoskeletal:  Positive for arthritis, back pain and joint pain. Negative for falls and muscle cramps.   Gastrointestinal:  Positive for nausea. Negative for abdominal pain, diarrhea, heartburn and vomiting.   Genitourinary:  Negative for hematuria.   Neurological:  Negative for excessive daytime sleepiness, dizziness, headaches, tremors and weakness.   Psychiatric/Behavioral:  Negative for substance abuse. The patient is nervous/anxious.               Objective   Vitals:  BP (!) 161/104   Pulse 66   Resp 18   Wt 109 kg (240 lb)   SpO2 98%   BMI 34.44 kg/m²        Vitals reviewed.  "  Constitutional:       Appearance: Healthy appearance. Well-developed and not in distress.   Neck:      Vascular: No JVD.      Trachea: No tracheal deviation.   Pulmonary:      Effort: Pulmonary effort is normal.      Breath sounds: Normal breath sounds.   Cardiovascular:      Normal rate. Regular rhythm.      Murmurs: There is no murmur.   Pulses:     Intact distal pulses.   Edema:     Peripheral edema absent.   Abdominal:      General: Bowel sounds are normal.      Palpations: Abdomen is soft.      Tenderness: There is no abdominal tenderness.   Musculoskeletal:         General: No deformity. Skin:     General: Skin is warm and dry.   Neurological:      Mental Status: Alert and oriented to person, place, and time.       I have examined the patient and agree with the above         Results Review:  I reviewed the patient's new clinical results.  Results from last 7 days   Lab Units 10/24/24  0749   WBC K/uL 5.0   HEMOGLOBIN g/dL 17.0   HEMATOCRIT % 49.6   PLATELETS K/uL 137*           Invalid input(s): \"LABALBU\", \"PROT\"          No results found for: \"TROPONINT\"                    Tele: Sinus rhythm    EKG: Sinus rhythm with lateral ST elevation      Assessment & Plan     Lateral STEMI, pain-free on arrival to Cath Lab.  Hypertension  Ongoing tobacco abuse      Plan:    Will proceed to cardiac catheterization plus or minus catheter-based intervention.  This was briefly discussed with the patient.  He verbalizes understanding and wishes to proceed.  Further recommendations to follow procedure.  Start beta-blocker, DAPT, and high intensity statin  Smoking cessation  Addendum: Focal diagonal disease treated with one 2.75 x 15 MADDI.  Otherwise nonflow-limiting disease.  LVEF 60% with focal anterolateral wall motion abnormality      ELVIS Austin obtained past medical, family history, social history, review of systems and functioned as a scribe for the remainder of the dictation for Dr. Reyes.  I have seen and " examined the patient, I have reviewed the note, discussed the case with the advance practice clinician, made necessary changes and I agree with the final note.    Sourav Reyes MD  10/24/24  09:56 EDT        Dictated utilizing Dragon dictation

## 2024-10-24 NOTE — Clinical Note
Allergies reviewed.  Patient does not have a current H&P in the chart. Procedural consent has not been signed. Blood consent has not been signed.

## 2024-10-24 NOTE — ED PROVIDER NOTES
[facility]  EMERGENCY DEPARTMENT ENCOUNTER        Pt Name: Reinier Martin  MRN: [id number]  Birthdate 1955  Date of evaluation: 10/24/2024  Provider: Se Lopez DO  PCP: Alexia Bardales APRN - NP  Note Started: 7:44 AM EDT 10/24/24    CHIEF COMPLAINT       Chief Complaint   Patient presents with    Chest Pain       HISTORY OF PRESENT ILLNESS: 1 or more Elements     History from : Patient    Limitations to history : None    Reinier Martin is a 69 y.o. male who presents to the Emergency Department for chest pain.  Patient states he had a couple episodes yesterday of some intermittent chest pain he states it will last for a minute or 2 and then went away so he did not pay much attention to it but however he states that 2 hours ago he started to have chest pain this morning.  He states that it never went away.  Patient states today as a sharp sensation he points right to his sternal area.  States there is nothing that he is done is made any better or any worse this morning.  He rates it as a 7 out of 10.  States he has had some dry heaving with it but denies any diaphoresis.  States he does not feel any short of breath than what he normally would be.  He states he is been short of breath for 40 years because of his smoking.  He is a pack and a half a day smoker for at least 40 years.  Patient states he does have high blood pressure.  States as far as he knows he is never been diagnosed with high cholesterol.  He is not diabetic he is a smoker.  Denied any primary relatives with history of CAD prior to age of 60.  Patient denies any diaphoresis with his symptoms.  Denies any radiation of the chest discomfort.  Patient does have a primary care provider denies any known allergies.  Past medical history per electronic charting shows chronic back pain, hypertension and osteoarthritis.    Nursing Notes were all reviewed and agreed with or any disagreements were addressed in the  intermittent chest pain he states it will last for a minute or 2 and then went away so he did not pay much attention to it but however he states that 2 hours ago he started to have chest pain this morning.  He states that it never went away.  Patient states today as a sharp sensation he points right to his sternal area.  States there is nothing that he is done is made any better or any worse this morning.  He rates it as a 7 out of 10.  States he has had some dry heaving with it but denies any diaphoresis.  States he does not feel any short of breath than what he normally would be.  He states he is been short of breath for 40 years because of his smoking.  He is a pack and a half a day smoker for at least 40 years.  Patient states he does have high blood pressure.  States as far as he knows he is never been diagnosed with high cholesterol.  He is not diabetic he is a smoker.  Denied any primary relatives with history of CAD prior to age of 60.  Patient denies any diaphoresis with his symptoms.  Denies any radiation of the chest discomfort.  Patient does have a primary care provider denies any known allergies.  Past medical history per electronic charting shows chronic back pain, hypertension and osteoarthritis.    After initial evaluation examination I did have conversation with the patient about the upcoming plan, treatment possible disposition which they are agreeable to the times dictation.  Advised findings on EKG is consistent with acute STEMI.  We will contact [facility] discussed the case with the on-call STEMI cardiologist.  Patient was given protocol medications of aspirin 325 mg, Plavix 600 mg and heparin bolus 60 units/kg with no max dosing.  Patient will have CBC, CMP, troponin and twelve-lead EKG and portable chest radiograph performed.  Otherwise he will be transferred on directly hopefully to the [facility] once we discussed the case with the cardiologist.    Transfer was

## 2024-10-25 ENCOUNTER — APPOINTMENT (OUTPATIENT)
Dept: CARDIOLOGY | Facility: HOSPITAL | Age: 69
End: 2024-10-25
Payer: MEDICARE

## 2024-10-25 LAB
ALBUMIN SERPL-MCNC: 3.7 G/DL (ref 3.5–5.2)
ALBUMIN/GLOB SERPL: 1.2 G/DL
ALP SERPL-CCNC: 92 U/L (ref 39–117)
ALT SERPL W P-5'-P-CCNC: 17 U/L (ref 1–41)
ANION GAP SERPL CALCULATED.3IONS-SCNC: 10 MMOL/L (ref 5–15)
AORTIC DIMENSIONLESS INDEX: 70 (DI)
AST SERPL-CCNC: 29 U/L (ref 1–40)
BH CV ECHO MEAS - AO MAX PG: 4 MMHG
BH CV ECHO MEAS - AO MEAN PG: 2 MMHG
BH CV ECHO MEAS - AO V2 VTI: 18.9 CM
BH CV ECHO MEAS - AVA(I,D): 2.2 CM2
BH CV ECHO MEAS - LV MAX PG: 1.1 MMHG
BH CV ECHO MEAS - LV MEAN PG: 0.6 MMHG
BH CV ECHO MEAS - LV V1 MAX: 52.1 CM/SEC
BH CV ECHO MEAS - LV V1 VTI: 13.2 CM
BH CV ECHO MEAS - LVOT DIAM: 2 CM
BH CV ECHO MEAS - MV DEC SLOPE: 267.8 CM/SEC2
BH CV ECHO MEAS - MV MAX PG: 0.9 MMHG
BH CV ECHO MEAS - MV MEAN PG: 3.3 MMHG
BH CV ECHO MEAS - MV P1/2T: 2.9 MSEC
BH CV ECHO MEAS - MV V2 VTI: 28.2 CM
BH CV ECHO MEAS - TAPSE (>1.6): 2.41 CM
BH CV XLRA - RV BASE: 3.82 CM
BH CV XLRA - RV LENGTH: 5.76 CM
BH CV XLRA - RV MID: 2.85 CM
BH CV XLRA - TDI S': 8.7 CM/SEC
BILIRUB SERPL-MCNC: 0.6 MG/DL (ref 0–1.2)
BUN SERPL-MCNC: 18 MG/DL (ref 8–23)
BUN/CREAT SERPL: 18.6 (ref 7–25)
CALCIUM SPEC-SCNC: 8.8 MG/DL (ref 8.6–10.5)
CHLORIDE SERPL-SCNC: 102 MMOL/L (ref 98–107)
CHOLEST SERPL-MCNC: 136 MG/DL (ref 0–200)
CO2 SERPL-SCNC: 22 MMOL/L (ref 22–29)
CREAT SERPL-MCNC: 0.97 MG/DL (ref 0.76–1.27)
DEPRECATED RDW RBC AUTO: 43.3 FL (ref 37–54)
EGFRCR SERPLBLD CKD-EPI 2021: 84 ML/MIN/1.73
ERYTHROCYTE [DISTWIDTH] IN BLOOD BY AUTOMATED COUNT: 12.4 % (ref 12.3–15.4)
GEN 5 2HR TROPONIN T REFLEX: 182 NG/L
GLOBULIN UR ELPH-MCNC: 3.1 GM/DL
GLUCOSE SERPL-MCNC: 140 MG/DL (ref 65–99)
HCT VFR BLD AUTO: 46.2 % (ref 37.5–51)
HDLC SERPL-MCNC: 55 MG/DL (ref 40–60)
HGB BLD-MCNC: 16 G/DL (ref 13–17.7)
LDLC SERPL CALC-MCNC: 66 MG/DL (ref 0–100)
LDLC/HDLC SERPL: 1.19 {RATIO}
LEFT ATRIUM VOLUME INDEX: 16.2 ML/M2
MCH RBC QN AUTO: 32.9 PG (ref 26.6–33)
MCHC RBC AUTO-ENTMCNC: 34.6 G/DL (ref 31.5–35.7)
MCV RBC AUTO: 94.9 FL (ref 79–97)
MV VALVE AREA BY PLANIMETRY: 1.47 CM2
PLATELET # BLD AUTO: 125 10*3/MM3 (ref 140–450)
PMV BLD AUTO: 10.7 FL (ref 6–12)
POTASSIUM SERPL-SCNC: 4.3 MMOL/L (ref 3.5–5.2)
PROT SERPL-MCNC: 6.8 G/DL (ref 6–8.5)
RBC # BLD AUTO: 4.87 10*6/MM3 (ref 4.14–5.8)
SODIUM SERPL-SCNC: 134 MMOL/L (ref 136–145)
TRIGL SERPL-MCNC: 78 MG/DL (ref 0–150)
TROPONIN T DELTA: -4 NG/L
TROPONIN T SERPL HS-MCNC: 186 NG/L
VLDLC SERPL-MCNC: 15 MG/DL (ref 5–40)
WBC NRBC COR # BLD AUTO: 5.15 10*3/MM3 (ref 3.4–10.8)

## 2024-10-25 PROCEDURE — 84484 ASSAY OF TROPONIN QUANT: CPT | Performed by: INTERNAL MEDICINE

## 2024-10-25 PROCEDURE — 93306 TTE W/DOPPLER COMPLETE: CPT

## 2024-10-25 PROCEDURE — 93306 TTE W/DOPPLER COMPLETE: CPT | Performed by: INTERNAL MEDICINE

## 2024-10-25 PROCEDURE — 93010 ELECTROCARDIOGRAM REPORT: CPT | Performed by: INTERNAL MEDICINE

## 2024-10-25 PROCEDURE — 80061 LIPID PANEL: CPT | Performed by: INTERNAL MEDICINE

## 2024-10-25 PROCEDURE — 93005 ELECTROCARDIOGRAM TRACING: CPT | Performed by: NURSE PRACTITIONER

## 2024-10-25 PROCEDURE — 99232 SBSQ HOSP IP/OBS MODERATE 35: CPT | Performed by: NURSE PRACTITIONER

## 2024-10-25 PROCEDURE — 80053 COMPREHEN METABOLIC PANEL: CPT | Performed by: INTERNAL MEDICINE

## 2024-10-25 PROCEDURE — 25010000002 HEPARIN (PORCINE) PER 1000 UNITS: Performed by: INTERNAL MEDICINE

## 2024-10-25 PROCEDURE — 85027 COMPLETE CBC AUTOMATED: CPT | Performed by: INTERNAL MEDICINE

## 2024-10-25 RX ADMIN — ATORVASTATIN CALCIUM 80 MG: 40 TABLET, FILM COATED ORAL at 21:48

## 2024-10-25 RX ADMIN — ASPIRIN 325 MG: 325 TABLET ORAL at 08:40

## 2024-10-25 RX ADMIN — CLOPIDOGREL BISULFATE 75 MG: 75 TABLET ORAL at 08:40

## 2024-10-25 RX ADMIN — LISINOPRIL 5 MG: 5 TABLET ORAL at 08:40

## 2024-10-25 RX ADMIN — METOPROLOL TARTRATE 25 MG: 25 TABLET, FILM COATED ORAL at 08:40

## 2024-10-25 RX ADMIN — Medication 10 ML: at 08:41

## 2024-10-25 RX ADMIN — METOPROLOL TARTRATE 25 MG: 25 TABLET, FILM COATED ORAL at 21:48

## 2024-10-25 RX ADMIN — HEPARIN SODIUM 5000 UNITS: 5000 INJECTION INTRAVENOUS; SUBCUTANEOUS at 21:48

## 2024-10-25 RX ADMIN — SENNOSIDES AND DOCUSATE SODIUM 2 TABLET: 50; 8.6 TABLET ORAL at 08:40

## 2024-10-25 NOTE — PAYOR COMM NOTE
Hiral Crenshaw (69 y.o. Male)       Date of Birth   1955    Social Security Number       Address   [address] [place] [address]    Home Phone   [phone]    MRN   [id number]       Yazdanism   Zoroastrianism    Marital Status                               Admission Date   10/24/24    Admission Type   Urgent    Admitting Provider   Sourav Reyes MD    Attending Provider   Sourav Reyes MD    Department, Room/Bed   [facility] 6A, N616/1       Discharge Date       Discharge Disposition       Discharge Destination                                 Attending Provider: Sourav Ryees MD    Allergies: No Known Allergies    Isolation: None   Infection: MRSA (05/10/18)   Code Status: CPR    Ht: --   Wt: 109 kg (240 lb)    Admission Cmt: None   Principal Problem: STEMI involving oth coronary artery of anterior wall [I21.09]                   Active Insurance as of 10/24/2024       Primary Coverage       Payor Plan Insurance Group Employer/Plan Group    [facility] MEDICARE REPLACEMENT [facility] MED ADV SNP HMO [id number]       Payor Plan Address Payor Plan Phone Number Payor Plan Fax Number Effective Dates    [address] [address]   1/1/2024 - None Entered    [facility] [id number]         Subscriber Name Subscriber Birth Date Member ID       HIRAL CRENSHAW 1955 [id number]               Secondary Coverage       Payor Plan Insurance Group Employer/Plan Group    [employer] [place] [employer] MEDICAID        Payor Plan Address Payor Plan Phone Number Payor Plan Fax Number Effective Dates    [address] [address] [phone]  4/20/2017 - None Entered    [facility] [id number]         Subscriber Name Subscriber Birth Date Member ID       HIRAL CRENSHAW 1955 [id number]                     Emergency Contacts        (Rel.) Home Phone Work Phone Mobile Phone    SHAUNAGAEL (Other) [phone]                 History & Physical        Sourav Reyes MD at  10/24/24 0913          [facility] at [facility]   History and physical      Patient Care Team:  Nisha Houston APRN as PCP - General (Family Medicine)  Nigel Mantilla MD as Referring Physician (Pain Medicine)  Edi Weller II, MD as Referring Physician (Pain Medicine)    Problem List:  Lateral STEMI  Hypertension  Anxiety/depression  Arthritis  Lumbar degenerative disc disease  Surgeries:  Cervical discectomy with fusion  Bilateral cataract  Femur fracture surgery        No Known Allergies        Current Facility-Administered Medications:     midazolam (VERSED) injection, , , Code / Trauma / Sedation Medication, Sourav Reyes MD, 2 mg at 10/24/24 0921    O2 (OXYGEN), , , Code / Trauma / Sedation Medication, Sourav Reyes MD, 2 L at 10/24/24 0919    Facility-Administered Medications Ordered in Other Encounters:     vancomycin 1500 mg/500 mL 0.9% NS IVPB ([facility]), 15 mg/kg, Intravenous, Once, Karina Osullivan PA-C           Medications Prior to Admission   Medication Sig Dispense Refill Last Dose/Taking    budesonide-formoterol (SYMBICORT) 160-4.5 MCG/ACT inhaler Inhale 2 puffs 2 (Two) Times a Day.       cyclobenzaprine (FLEXERIL) 10 MG tablet Take 10 mg by mouth 3 (Three) Times a Day As Needed for Muscle Spasms.       DULoxetine (CYMBALTA) 30 MG capsule        gabapentin (NEURONTIN) 300 MG capsule        HYDROcodone-acetaminophen (NORCO)  MG per tablet Take 1 tablet by mouth.       ibuprofen (ADVIL,MOTRIN) 800 MG tablet        lisinopril (PRINIVIL,ZESTRIL) 20 MG tablet             Subjective.   History of present illness:    Patient is a 69-year-old  male who is brought emergently to the cardiac catheterization lab secondary to lateral STEMI.  Patient has no previous history of coronary disease.  He has noted risk factors of high blood pressure, advanced age, and ongoing tobacco abuse.  Patient was in his usual state of health until about 2-1/2 hours prior to  presentation.  Notes that he awoke with what he thought was indigestion.  Also had associated nausea and dry heaves.  He presented to the outside facility and at that time was noted to have lateral ST elevation.  He was then brought emergently to [facility] for further invasive management.  On arrival to Cath Lab patient is pain-free.      Social History     Socioeconomic History    Marital status:    Tobacco Use    Smoking status: Every Day     Current packs/day: 2.00     Average packs/day: 2.0 packs/day for 40.0 years (80.0 ttl pk-yrs)     Types: Cigarettes    Smokeless tobacco: Never   Substance and Sexual Activity    Alcohol use: Yes     Alcohol/week: 8.0 standard drinks of alcohol     Types: 8 Cans of beer per week     Comment: drinks on occasion     Drug use: No    Sexual activity: Defer       Per patient family history negative for history of CAD.    Review of Systems:  Review of Systems   Constitutional: Negative for fever and malaise/fatigue.   HENT:  Negative for nosebleeds.    Eyes:  Negative for redness and visual disturbance.   Cardiovascular:  Positive for chest pain. Negative for orthopnea, palpitations and paroxysmal nocturnal dyspnea.   Respiratory:  Negative for cough, snoring, sputum production and wheezing.    Hematologic/Lymphatic: Negative for bleeding problem.   Skin:  Negative for flushing, itching and rash.   Musculoskeletal:  Positive for arthritis, back pain and joint pain. Negative for falls and muscle cramps.   Gastrointestinal:  Positive for nausea. Negative for abdominal pain, diarrhea, heartburn and vomiting.   Genitourinary:  Negative for hematuria.   Neurological:  Negative for excessive daytime sleepiness, dizziness, headaches, tremors and weakness.   Psychiatric/Behavioral:  Negative for substance abuse. The patient is nervous/anxious.               Objective  Vitals:  BP (!) 161/104   Pulse 66   Resp 18   Wt 109 kg (240 lb)   SpO2 98%   BMI 34.44 kg/m²   "      Vitals reviewed.   Constitutional:       Appearance: Healthy appearance. Well-developed and not in distress.   Neck:      Vascular: No JVD.      Trachea: No tracheal deviation.   Pulmonary:      Effort: Pulmonary effort is normal.      Breath sounds: Normal breath sounds.   Cardiovascular:      Normal rate. Regular rhythm.      Murmurs: There is no murmur.   Pulses:     Intact distal pulses.   Edema:     Peripheral edema absent.   Abdominal:      General: Bowel sounds are normal.      Palpations: Abdomen is soft.      Tenderness: There is no abdominal tenderness.   Musculoskeletal:         General: No deformity. Skin:     General: Skin is warm and dry.   Neurological:      Mental Status: Alert and oriented to person, place, and time.       I have examined the patient and agree with the above         Results Review:  I reviewed the patient's new clinical results.  Results from last 7 days   Lab Units 10/24/24  0749   WBC K/uL 5.0   HEMOGLOBIN g/dL 17.0   HEMATOCRIT % 49.6   PLATELETS K/uL 137*           Invalid input(s): \"LABALBU\", \"PROT\"          No results found for: \"TROPONINT\"                    Tele: Sinus rhythm    EKG: Sinus rhythm with lateral ST elevation      Assessment & Plan    Lateral STEMI, pain-free on arrival to Cath Lab.  Hypertension  Ongoing tobacco abuse      Plan:    Will proceed to cardiac catheterization plus or minus catheter-based intervention.  This was briefly discussed with the patient.  He verbalizes understanding and wishes to proceed.  Further recommendations to follow procedure.  Start beta-blocker, DAPT, and high intensity statin  Smoking cessation  Addendum: Focal diagonal disease treated with one 2.75 x 15 MADDI.  Otherwise nonflow-limiting disease.  LVEF 60% with focal anterolateral wall motion abnormality      ELVIS Austin obtained past medical, family history, social history, review of systems and functioned as a scribe for the remainder of the dictation for Dr." Eric.  I have seen and examined the patient, I have reviewed the note, discussed the case with the advance practice clinician, made necessary changes and I agree with the final note.    Sourav Reyes MD  10/24/24  09:56 EDT        Dictated utilizing Dragon dictation      Electronically signed by Sourav Reyes MD at 10/24/24 0956

## 2024-10-25 NOTE — CASE MANAGEMENT/SOCIAL WORK
[facility]charge Planning Assessment  [facility]     Patient Name: Luis Crenshaw  MRN: [id number]  Today's Date: 10/25/2024    Admit Date: 10/24/2024    Plan: discharge plan   Discharge Needs Assessment       Row Name 10/25/24 1633       Living Environment    People in Home significant other    Name(s) of People in Home Amaris Sotelo(s/o)    Current Living Arrangements home    Primary Care Provided by self    Provides Primary Care For no one    Family Caregiver if Needed significant other    Family Caregiver Names Amaris Sotelo(s/o)    Quality of Family Relationships unable to assess    Able to Return to Prior Arrangements yes    Living Arrangement Comments I met with pt at bedside with permission regarding discharge plan. Pt resides in [place] in a home with s/o, Amaris       Transition Planning    Patient/Family Anticipates Transition to home with family    Patient/Family Anticipated Services at Transition     Transportation Anticipated family or friend will provide       Discharge Needs Assessment    Readmission Within the Last 30 Days no previous admission in last 30 days    Equipment Currently Used at Home none    Concerns to be Addressed discharge planning    Equipment Needed After Discharge none    Discharge Coordination/Progress Pt confirms that he has [employer] Medicare and [facility] [facility] insurance with prescription coverage. Pt uses [facility] in [facility].   Pt reports he is independent with ADLs and uses no DME or HH. Pt is here with a STEMI and had a heart cath. Plan is home at discharge and denies discharge needs.                   Discharge Plan       Row Name 10/25/24 4404       Plan    Plan discharge plan    Plan Comments Pt reports he is independent with ADLs and uses no DME or HH. Pt is here with a STEMI and had a heart cath. Plan is home at discharge and denies discharge needs.    Final Discharge Disposition Code 01 - home or self-care                  Continued Care  and Services - Admitted Since 10/24/2024    No active coordination exists for this encounter.          Demographic Summary       Row Name 10/25/24 1633       General Information    General Information Comments PCP is Nisha Houston       Contact Information    Permission Granted to Share Info With     Contact Information Obtained for     Contact Information Comments Amaris valentin(girlfriend) [phone]                   Functional Status    No documentation.                  Psychosocial    No documentation.                  Abuse/Neglect    No documentation.                  Legal    No documentation.                  Substance Abuse    No documentation.                  Patient Forms    No documentation.                     Jeannine Ballesteros RN

## 2024-10-25 NOTE — PROGRESS NOTES
[facility] at [facility]   Inpatient Progress Note       LOS: 1 day   Patient Care Team:  Nisha [place] APRN as PCP - General (Family Medicine)  Nigel Mantilla MD as Referring Physician (Pain Medicine)  Edi Weller II, MD as Referring Physician (Pain Medicine)    Chief Complaint:  follow-up for STEMI    Subjective     Interval History:     Patient in bed. No chest pain. Notes feeling well. No current CV complaints.     Review of Systems:   Pertinent positives noted in history, exam, and assessment. Otherwise reviewed and negative.      Objective     Vitals:  Blood pressure 130/78, pulse 55, temperature 98.1 °F (36.7 °C), temperature source Oral, resp. rate 16, weight 109 kg (240 lb), SpO2 95%.     Intake/Output Summary (Last 24 hours) at 10/25/2024 0909  Last data filed at 10/25/2024 0600  Gross per 24 hour   Intake 960 ml   Output 1300 ml   Net -340 ml     Vitals reviewed.   Constitutional:       Appearance: Well-developed and not in distress.   Neck:      Vascular: No JVD.      Trachea: No tracheal deviation.   Pulmonary:      Effort: Pulmonary effort is normal.      Breath sounds: Normal breath sounds.   Cardiovascular:      Normal rate. Regular rhythm.      Murmurs: There is no murmur.      Comments: Right radial access site benign  Pulses:     Intact distal pulses.   Edema:     Peripheral edema absent.   Musculoskeletal:         General: No deformity. Skin:     General: Skin is warm and dry.   Neurological:      Mental Status: Alert and oriented to person, place, and time.            Results Review:     I reviewed the patient's new clinical results.    Results from last 7 days   Lab Units 10/25/24  0435   WBC 10*3/mm3 5.15   HEMOGLOBIN g/dL 16.0   HEMATOCRIT % 46.2   PLATELETS 10*3/mm3 125*     Results from last 7 days   Lab Units 10/25/24  0436   SODIUM mmol/L 134*   POTASSIUM mmol/L 4.3   CHLORIDE mmol/L 102   CO2 mmol/L 22.0   BUN mg/dL 18   CREATININE mg/dL 0.97    CALCIUM mg/dL 8.8   BILIRUBIN mg/dL 0.6   ALK PHOS U/L 92   ALT (SGPT) U/L 17   AST (SGOT) U/L 29   GLUCOSE mg/dL 140*     Results from last 7 days   Lab Units 10/25/24  0436   SODIUM mmol/L 134*   POTASSIUM mmol/L 4.3   CHLORIDE mmol/L 102   CO2 mmol/L 22.0   BUN mg/dL 18   CREATININE mg/dL 0.97   GLUCOSE mg/dL 140*   CALCIUM mg/dL 8.8         Lab Results   Lab Value Date/Time    TROPONINT 182 (C) 10/25/2024 0600    TROPONINT 186 (C) 10/25/2024 0436     Results from last 7 days   Lab Units 10/24/24  1032   TSH uIU/mL 2.200     Results from last 7 days   Lab Units 10/25/24  0436   CHOLESTEROL mg/dL 136   TRIGLYCERIDES mg/dL 78   HDL CHOL mg/dL 55   LDL CHOL mg/dL 66         Results from last 7 days   Lab Units 10/25/24  0600 10/25/24  0436   HSTROP T ng/L 182* 186*     LHC:    Lateral STEMI due to 99% first diagonal stenosis.  Treated 2.75 x 15 Xience MADDI    30% LAD, and LCx disease.    LVEF 60% with focal anterolateral wall motion abnormality.    Tele:  SR    Assessment:      STEMI involving oth coronary artery of anterior wall      Lateral STEMI  S/p MADDI to D1  Normal EF by LV.  ECHO ordered and pending  Hypertension  Ongoing tobacco abuse  Dyslipidemia     Plan:  Patient currently stable from CV standpoint.  Would like to follow-up as an outpatient in [place].  Continue current CV medications.  ECHO today.  Encouraged smoking cessation   Likely home tomorrow if remains stable.       ELVIS Austin   Dictated utilizing Dragon dictation

## 2024-10-26 ENCOUNTER — READMISSION MANAGEMENT (OUTPATIENT)
Dept: CALL CENTER | Facility: HOSPITAL | Age: 69
End: 2024-10-26
Payer: MEDICARE

## 2024-10-26 VITALS
BODY MASS INDEX: 34.44 KG/M2 | DIASTOLIC BLOOD PRESSURE: 77 MMHG | RESPIRATION RATE: 16 BRPM | OXYGEN SATURATION: 94 % | SYSTOLIC BLOOD PRESSURE: 129 MMHG | HEART RATE: 57 BPM | WEIGHT: 240 LBS | TEMPERATURE: 97.6 F

## 2024-10-26 LAB
DEPRECATED RDW RBC AUTO: 43.2 FL (ref 37–54)
ERYTHROCYTE [DISTWIDTH] IN BLOOD BY AUTOMATED COUNT: 12.2 % (ref 12.3–15.4)
HCT VFR BLD AUTO: 48.7 % (ref 37.5–51)
HGB BLD-MCNC: 16.4 G/DL (ref 13–17.7)
MCH RBC QN AUTO: 32.1 PG (ref 26.6–33)
MCHC RBC AUTO-ENTMCNC: 33.7 G/DL (ref 31.5–35.7)
MCV RBC AUTO: 95.3 FL (ref 79–97)
PLATELET # BLD AUTO: 143 10*3/MM3 (ref 140–450)
PMV BLD AUTO: 11.3 FL (ref 6–12)
QT INTERVAL: 476 MS
QTC INTERVAL: 467 MS
RBC # BLD AUTO: 5.11 10*6/MM3 (ref 4.14–5.8)
WBC NRBC COR # BLD AUTO: 5.1 10*3/MM3 (ref 3.4–10.8)

## 2024-10-26 PROCEDURE — 25010000002 HEPARIN (PORCINE) PER 1000 UNITS: Performed by: INTERNAL MEDICINE

## 2024-10-26 PROCEDURE — 85027 COMPLETE CBC AUTOMATED: CPT | Performed by: INTERNAL MEDICINE

## 2024-10-26 PROCEDURE — 99238 HOSP IP/OBS DSCHRG MGMT 30/<: CPT | Performed by: INTERNAL MEDICINE

## 2024-10-26 RX ORDER — ATORVASTATIN CALCIUM 80 MG/1
80 TABLET, FILM COATED ORAL NIGHTLY
Qty: 90 TABLET | Refills: 3 | Status: SHIPPED | OUTPATIENT
Start: 2024-10-26 | End: 2025-10-21

## 2024-10-26 RX ORDER — CLOPIDOGREL BISULFATE 75 MG/1
75 TABLET ORAL DAILY
Qty: 90 TABLET | Refills: 3 | Status: SHIPPED | OUTPATIENT
Start: 2024-10-26 | End: 2024-10-26

## 2024-10-26 RX ORDER — ATORVASTATIN CALCIUM 80 MG/1
80 TABLET, FILM COATED ORAL NIGHTLY
Qty: 90 TABLET | Refills: 3 | Status: SHIPPED | OUTPATIENT
Start: 2024-10-26 | End: 2024-10-26

## 2024-10-26 RX ORDER — ASPIRIN 81 MG/1
81 TABLET ORAL DAILY
Qty: 90 TABLET | Refills: 3 | Status: SHIPPED | OUTPATIENT
Start: 2024-10-26 | End: 2024-10-26

## 2024-10-26 RX ORDER — METOPROLOL TARTRATE 25 MG/1
25 TABLET, FILM COATED ORAL EVERY 12 HOURS SCHEDULED
Qty: 180 TABLET | Refills: 3 | Status: SHIPPED | OUTPATIENT
Start: 2024-10-26 | End: 2024-10-26

## 2024-10-26 RX ORDER — ASPIRIN 81 MG/1
81 TABLET ORAL DAILY
Qty: 90 TABLET | Refills: 3 | Status: SHIPPED | OUTPATIENT
Start: 2024-10-26

## 2024-10-26 RX ORDER — CLOPIDOGREL BISULFATE 75 MG/1
75 TABLET ORAL DAILY
Qty: 90 TABLET | Refills: 3 | Status: SHIPPED | OUTPATIENT
Start: 2024-10-26

## 2024-10-26 RX ORDER — METOPROLOL TARTRATE 25 MG/1
25 TABLET, FILM COATED ORAL EVERY 12 HOURS SCHEDULED
Qty: 180 TABLET | Refills: 3 | Status: SHIPPED | OUTPATIENT
Start: 2024-10-26

## 2024-10-26 RX ADMIN — LISINOPRIL 5 MG: 5 TABLET ORAL at 10:03

## 2024-10-26 RX ADMIN — CLOPIDOGREL BISULFATE 75 MG: 75 TABLET ORAL at 10:03

## 2024-10-26 RX ADMIN — ASPIRIN 325 MG: 325 TABLET ORAL at 10:02

## 2024-10-26 RX ADMIN — HEPARIN SODIUM 5000 UNITS: 5000 INJECTION INTRAVENOUS; SUBCUTANEOUS at 10:03

## 2024-10-26 NOTE — DISCHARGE SUMMARY
Date of Discharge:  10/26/2024    Discharge Diagnosis: ST elevation myocardial infarction    Presenting Problem/History of Present Illness  Active Hospital Problems    Diagnosis  POA    **STEMI involving oth coronary artery of anterior wall [I21.09]  Yes      Resolved Hospital Problems   No resolved problems to display.          Hospital Course  Patient is a 69 y.o. male presented with chest pain.  Patient went drug-eluting stent placement.  Patient was monitored in hospital without significant arrhythmia.  Patient was discharged home on goal-directed medical therapy.      Procedures Performed    Procedure(s):  Left Heart Cath  -------------------       Consults:   Consults       No orders found from 9/25/2024 to 10/25/2024.            Pertinent Test Results: None    Condition on Discharge: Stable    Vital Signs  Temp:  [97.6 °F (36.4 °C)-98.3 °F (36.8 °C)] 97.6 °F (36.4 °C)  Heart Rate:  [58] 58  Resp:  [16] 16  BP: (112-143)/(64-91) 140/85    Physical Exam:     General Appearance:  Alert, cooperative, in no acute distress   Head:  Normocephalic, without obvious abnormality, atraumatic   Eyes:  Lids and lashes normal, conjunctivae and sclerae normal, no icterus, no pallor, corneas clear, PERRLA   Ears:  Ears appear intact with no abnormalities noted   Throat:  No oral lesions, no thrush, oral mucosa moist   Neck:  No adenopathy, supple, trachea midline, no thyromegaly, no carotid bruit, no JVD   Back:  No kyphosis present, no scoliosis present, no skin lesions, erythema or scars, no tenderness to percussion or palpation, range of motion normal   Lungs:  Clear to auscultation, respirations regular, even and unlabored    Heart:  Regular rhythm and normal rate, normal S1 and S2, no murmur, no gallop, no rub, no click   Chest Wall:  No abnormalities observed   Abdomen:  Normal bowel sounds, no masses, no organomegaly, soft non-tender, non-distended, no guarding, no rebound tenderness   Rectal:  Deferred    Extremities:  Moves all extremities well, no edema, no cyanosis, no redness   Pulses:  Pulses palpable and equal bilaterally   Skin:  No bleeding, bruising or rash   Lymph nodes:  No palpable adenopathy   Neurologic:  Cranial nerves 2 - 12 grossly intact, sensation intact, DTR present and equal bilaterally                                                                               Discharge Disposition      Discharge Medications     Discharge Medications        ASK your doctor about these medications        Instructions Start Date   budesonide-formoterol 160-4.5 MCG/ACT inhaler  Commonly known as: SYMBICORT   2 puffs, Inhalation, 2 Times Daily - RT      cyclobenzaprine 10 MG tablet  Commonly known as: FLEXERIL   10 mg, Oral, 3 Times Daily PRN      DULoxetine 30 MG capsule  Commonly known as: CYMBALTA   No dose, route, or frequency recorded.      gabapentin 300 MG capsule  Commonly known as: NEURONTIN   No dose, route, or frequency recorded.      HYDROcodone-acetaminophen  MG per tablet  Commonly known as: NORCO   1 tablet, Oral      ibuprofen 800 MG tablet  Commonly known as: ADVIL,MOTRIN   No dose, route, or frequency recorded.      lisinopril 20 MG tablet  Commonly known as: PRINIVIL,ZESTRIL   No dose, route, or frequency recorded.               Discharge Diet: Cardiac    Activity at Discharge: No lifting more than 5 pounds for 5 days no soaking wounds for 3 days no driving for 24 hours    Follow-up Appointments  No future appointments.  Patient to be arranged to follow in our vein      Test Results Pending at Discharge  Pending Labs       Order Current Status    CBC (No Diff) Collected (10/26/24 0822)             David Warner MD  10/26/24  08:25 EDT

## 2024-10-26 NOTE — OUTREACH NOTE
Prep Survey      Flowsheet Row Responses   [facility] patient discharged from? [facility]   Is LACE score < 7 ? Yes   Eligibility Readm Mgmt   Discharge diagnosis STEMI   Does the patient have one of the following disease processes/diagnoses(primary or secondary)? Acute MI (STEMI,NSTEMI)   Does the patient have Home health ordered? No   Is there a DME ordered? No   Prep survey completed? Yes            VIRY QUINTANILLA - Registered Nurse

## 2024-10-26 NOTE — PROGRESS NOTES
"Enter Query Response Below      Query Response: type 1              If applicable, please update the problem list.   Patient: Luis Crenshaw        : 1955  Account: [id number]           Admit Date:         How to Respond to this query:       a. Click New Note     b. Answer query within the yellow box.                c. Update the Problem List, if applicable.      If you have any questions about this query contact me at: [email or web address]     :    Patient admitted on 10/24 with a STEMI.  HS Troponin T 186, 182, Troponin T Delta -4.  10/24 Per H&P \"EKG: Sinus rhythm with lateral ST elevation.  10/24 Per heart cath report \"Lateral STEMI due to 99% first diagonal stenosis.  Treated 2.75 x 15 Xience MADDI.\"    Please clarify the type of MI the patient was treated/monitored for:    MI-Type I  MI-Type 2 MI due to fixed CAD  Other- specify______    By submitting this query, we are merely seeking further clarification of documentation to accurately reflect all conditions that you are monitoring, evaluating, treating or that extend the hospitalization or utilize additional resources of care. Please utilize your independent clinical judgment when addressing the question(s) above.     This query and your response, once completed, will be entered into the legal medical record.    Sincerely,  Flores Bolanos RN  Clinical Documentation Integrity Program     "

## 2024-10-26 NOTE — PROGRESS NOTES
LOS: 2 days   Patient Care Team:  Nisha [place] APRN as PCP - General (Family Medicine)  Nigel Mantilla MD as Referring Physician (Pain Medicine)  Edi Weller II, MD as Referring Physician (Pain Medicine)    Chief Complaint: Chest pain    Subjective     Interval History:     No issues overnight.  No issues on monitor.  No chest pain or shortness of breath.  Up and around without issues  Review of Systems:   12 point review of systems reviewed pertinent for those mentioned HPI    Objective     Vital Sign Min/Max for last 24 hours  Temp  Min: 97.6 °F (36.4 °C)  Max: 98.3 °F (36.8 °C)   BP  Min: 112/71  Max: 143/91   Pulse  Min: 58  Max: 58   Resp  Min: 16  Max: 16   SpO2  Min: 94 %  Max: 94 %   No data recorded   No data recorded     Flowsheet Rows      Flowsheet Row First Filed Value   Admission Height --   Admission Weight 109 kg (240 lb) Documented at 10/24/2024 0900            Physical Exam:     General Appearance:  Alert, cooperative, in no acute distress   Head:  Normocephalic, without obvious abnormality, atraumatic   Eyes:  Lids and lashes normal, conjunctivae and sclerae normal, no icterus, no pallor, corneas clear, PERRLA   Ears:  Ears appear intact with no abnormalities noted   Throat:  No oral lesions, no thrush, oral mucosa moist   Neck:  No adenopathy, supple, trachea midline, no thyromegaly, no carotid bruit, no JVD   Back:  No kyphosis present, no scoliosis present, no skin lesions, erythema or scars, no tenderness to percussion or palpation, range of motion normal   Lungs:  Clear to auscultation, respirations regular, even and unlabored    Heart:  Regular rhythm and normal rate, normal S1 and S2, no murmur, no gallop, no rub, no click   Chest Wall:  No abnormalities observed   Abdomen:  Normal bowel sounds, no masses, no organomegaly, soft non-tender, non-distended, no guarding, no rebound tenderness   Rectal:  Deferred   Extremities:  Moves all extremities well, no edema, no  "cyanosis, no redness   Pulses:  Pulses palpable and equal bilaterally   Skin:  No bleeding, bruising or rash   Lymph nodes:  No palpable adenopathy   Neurologic:  Cranial nerves 2 - 12 grossly intact, sensation intact, DTR present and equal bilaterally                                                                                Results Review:     I reviewed the patient's new clinical results.  I reviewed the patient's new imaging results and agree with the interpretation.  I reviewed the patient's other test results and agree with the interpretation  I personally viewed and interpreted the patient's EKG/Telemetry data    Ejection Fraction  No results found for: \"EF\"    Echo EF Estimated  No results found for: \"ECHOEFEST\"    Nuclear Stress Ejection Fraction  No components found for: \"NUCEF\"    Cath Ejection Fraction Quantitative  Lab Results   Component Value Date    CATHEF 60 10/24/2024           Medication Review: Reviewed    Assessment & Plan       STEMI involving oth coronary artery of anterior wall      Hypertension hyperlipidemia coronary disease    Plan: Will discharge home on goal-directed medical therapy to follow-up as an outpatient and our plan.  Patient will be enrolled in cardiac rehab.      David Warner MD  10/26/24  08:23 EDT        "

## 2024-10-29 ENCOUNTER — TELEPHONE (OUTPATIENT)
Dept: CARDIOLOGY | Facility: HOSPITAL | Age: 69
End: 2024-10-29
Payer: MEDICARE

## 2024-10-29 ENCOUNTER — TELEPHONE (OUTPATIENT)
Dept: CARDIOLOGY | Facility: CLINIC | Age: 69
End: 2024-10-29
Payer: MEDICARE

## 2024-10-29 LAB
AORTIC DIMENSIONLESS INDEX: 70 (DI)
BH CV ECHO MEAS - AO MAX PG: 4 MMHG
BH CV ECHO MEAS - AO MEAN PG: 2 MMHG
BH CV ECHO MEAS - AO V2 VTI: 18.9 CM
BH CV ECHO MEAS - AVA(I,D): 2.2 CM2
BH CV ECHO MEAS - LV MAX PG: 1.1 MMHG
BH CV ECHO MEAS - LV MEAN PG: 0.6 MMHG
BH CV ECHO MEAS - LV V1 MAX: 52.1 CM/SEC
BH CV ECHO MEAS - LV V1 VTI: 13.2 CM
BH CV ECHO MEAS - LVOT DIAM: 2 CM
BH CV ECHO MEAS - MV DEC SLOPE: 267.8 CM/SEC2
BH CV ECHO MEAS - MV MAX PG: 0.9 MMHG
BH CV ECHO MEAS - MV MEAN PG: 3.3 MMHG
BH CV ECHO MEAS - MV P1/2T: 2.9 MSEC
BH CV ECHO MEAS - MV V2 VTI: 28.2 CM
BH CV ECHO MEAS - TAPSE (>1.6): 2.41 CM
BH CV XLRA - RV BASE: 3.82 CM
BH CV XLRA - RV LENGTH: 5.76 CM
BH CV XLRA - RV MID: 2.85 CM
BH CV XLRA - TDI S': 8.7 CM/SEC
LEFT ATRIUM VOLUME INDEX: 16.2 ML/M2
MV VALVE AREA BY PLANIMETRY: 1.47 CM2

## 2024-10-29 NOTE — TELEPHONE ENCOUNTER
Spoke with patient regarding hospital f/u for appointment [facility].  The patient refused appt stating he was seeing someone closer to home.

## 2024-10-29 NOTE — TELEPHONE ENCOUNTER
CALLED PATIENT TO ADVISE [facility] IS OUT OF NETWORK WITH HIS [employer] MEDICARE ADVANATAGE HMO PLAN, PATIENT WILL NEED TO FOLLOW UP WITH HIS PCP AND/OR CONTACT HIS INSURANCE FOR A IN NETWORK PROVIDER FOR FOLLOW UP, NO ANSWER VM NOT SET UP ON EITHER NUMBER, SENT LETTER LG

## 2024-10-30 ENCOUNTER — READMISSION MANAGEMENT (OUTPATIENT)
Dept: CALL CENTER | Facility: HOSPITAL | Age: 69
End: 2024-10-30
Payer: MEDICARE

## 2024-10-30 NOTE — OUTREACH NOTE
AMI Week 1 Survey      Flowsheet Row Responses   [facility] patient discharged from? [place]   Does the patient have one of the following disease processes/diagnoses(primary or secondary)? Acute MI (STEMI,NSTEMI)   Week 1 attempt successful? No   Unsuccessful attempts Attempt 1            Julito QUINTANILLA - Registered Nurse

## 2024-10-31 ENCOUNTER — TELEPHONE (OUTPATIENT)
Dept: CARDIOLOGY | Facility: CLINIC | Age: 69
End: 2024-10-31

## 2024-10-31 NOTE — TELEPHONE ENCOUNTER
Caller: Luis Crenshaw    Relationship to patient: Den    Best call back number: [phone]    Chief complaint: PT HAD A STINT PLACES ON 10.24.24. HE WOULD LIKE TO SCHEDULE A FOLLOW UP WITH . PLEASE REACH OUT TO THE PT TO SCHEDULE.    Type of visit: FOLLOW UP    Requested date: ASAP     If rescheduling, when is the original appointment: ASAP     Additional notes: ONLY WANTS TO BE SEEN IN [place]

## 2024-11-06 ENCOUNTER — READMISSION MANAGEMENT (OUTPATIENT)
Dept: CALL CENTER | Facility: HOSPITAL | Age: 69
End: 2024-11-06
Payer: MEDICARE

## 2024-11-06 NOTE — OUTREACH NOTE
AMI Week 2 Survey      Flowsheet Row Responses   [facility] facility patient discharged from? [facility]   Does the patient have one of the following disease processes/diagnoses(primary or secondary)? Acute MI (STEMI,NSTEMI)   Week 2 attempt successful? No   Unsuccessful attempts Attempt 1            Yumiko CHRISTIANSON - Registered Nurse

## 2024-11-09 ENCOUNTER — HOSPITAL ENCOUNTER (EMERGENCY)
Facility: HOSPITAL | Age: 69
Discharge: HOME | End: 2024-11-09
Attending: EMERGENCY MEDICINE
Payer: MEDICARE

## 2024-11-09 VITALS
HEIGHT: 71 IN | WEIGHT: 240 LBS | SYSTOLIC BLOOD PRESSURE: 130 MMHG | TEMPERATURE: 97.5 F | RESPIRATION RATE: 16 BRPM | OXYGEN SATURATION: 96 % | HEART RATE: 59 BPM | BODY MASS INDEX: 33.6 KG/M2 | DIASTOLIC BLOOD PRESSURE: 83 MMHG

## 2024-11-09 DIAGNOSIS — R04.0 EPISTAXIS: Primary | ICD-10-CM

## 2024-11-09 PROCEDURE — 30901 CONTROL OF NOSEBLEED: CPT

## 2024-11-09 PROCEDURE — 6370000000 HC RX 637 (ALT 250 FOR IP): Performed by: EMERGENCY MEDICINE

## 2024-11-09 PROCEDURE — 99283 EMERGENCY DEPT VISIT LOW MDM: CPT

## 2024-11-09 RX ORDER — ASPIRIN 81 MG/1
81 TABLET ORAL DAILY
COMMUNITY
Start: 2024-10-26

## 2024-11-09 RX ORDER — OXYMETAZOLINE HYDROCHLORIDE 0.05 G/100ML
2 SPRAY NASAL ONCE
Status: COMPLETED | OUTPATIENT
Start: 2024-11-09 | End: 2024-11-09

## 2024-11-09 RX ORDER — ATORVASTATIN CALCIUM 80 MG/1
80 TABLET, FILM COATED ORAL
COMMUNITY
Start: 2024-10-26 | End: 2025-10-21

## 2024-11-09 RX ORDER — LISINOPRIL 10 MG/1
10 TABLET ORAL DAILY
COMMUNITY
Start: 2024-11-05

## 2024-11-09 RX ORDER — METOPROLOL TARTRATE 25 MG/1
25 TABLET, FILM COATED ORAL
COMMUNITY
Start: 2024-10-26

## 2024-11-09 RX ORDER — CLOPIDOGREL BISULFATE 75 MG/1
75 TABLET ORAL DAILY
COMMUNITY
Start: 2024-10-26

## 2024-11-09 RX ADMIN — SILVER NITRATE APPLICATORS 1 EACH: 25; 75 STICK TOPICAL at 18:55

## 2024-11-09 RX ADMIN — OXYMETAZOLINE HYDROCHLORIDE 2 SPRAY: 0.05 SPRAY NASAL at 18:39

## 2024-11-09 ASSESSMENT — LIFESTYLE VARIABLES
HOW OFTEN DO YOU HAVE A DRINK CONTAINING ALCOHOL: NEVER
HOW MANY STANDARD DRINKS CONTAINING ALCOHOL DO YOU HAVE ON A TYPICAL DAY: PATIENT DOES NOT DRINK

## 2024-11-09 NOTE — ED PROVIDER NOTES
[facility]  EMERGENCY DEPARTMENT ENCOUNTER        Pt Name: Reinier Martin  MRN: [id number]  Birthdate 1955  Date of evaluation: 11/9/2024  Provider: Alvarez Walker DO  PCP: Jim Campos DO  Note Started: 6:58 PM EST 11/9/24    CHIEF COMPLAINT       Chief Complaint   Patient presents with    Epistaxis       HISTORY OF PRESENT ILLNESS:     History from : Patient    Limitations to history : None    Reinier Martin is a 69 y.o. male who presents to the ER with a nosebleed.  Patient was recently admitted for an MI.  Was placed on aspirin and Plavix.  Has been taking it and nosebleed that started today which she was unable to control.  He denies any headache.  Denies any fever or chills.  Denies any digital manipulation.    Nursing Notes were all reviewed and agreed with or any disagreements were addressed in the HPI.    REVIEW OF SYSTEMS :      6 systems reviewed and negative except as in HPI/MDM    PAST MEDICAL HISTORY     Past Medical History:   Diagnosis Date    Chronic back pain     Hypertension     Osteoarthritis        SURGICAL HISTORY     Past Surgical History:   Procedure Laterality Date    FRACTURE SURGERY  1975    R femur       CURRENTMEDICATIONS       Previous Medications    ALBUTEROL SULFATE (VENTOLIN HFA IN)    Inhale  into the lungs every 4 hours as needed.    ASPIRIN 81 MG EC TABLET    Take 1 tablet by mouth daily    ATORVASTATIN (LIPITOR) 80 MG TABLET    Take 1 tablet by mouth    CLOPIDOGREL (PLAVIX) 75 MG TABLET    Take 1 tablet by mouth daily    HYDROCODONE-ACETAMINOPHEN (NORCO)  MG PER TABLET    Take 1 tablet by mouth every 8 hours as needed for Pain.    LISINOPRIL (PRINIVIL;ZESTRIL) 10 MG TABLET    Take 1 tablet by mouth daily    METOPROLOL TARTRATE (LOPRESSOR) 25 MG TABLET    Take 1 tablet by mouth    NAPROXEN (NAPROSYN) 500 MG TABLET    Take 1 tablet by mouth 2 times daily as needed for Pain       ALLERGIES     Patient has no known

## 2024-11-09 NOTE — DISCHARGE INSTRUCTIONS
If the nosebleed restarts then put a tissue in your nose and pinch and hold for at least 20 minutes for looking.  You can use the Afrin as needed.  Make sure your blood pressure is controlled.  Return to the ER as needed for new or worsening symptoms. Do NOT stop the plavix

## 2024-11-09 NOTE — ED TRIAGE NOTES
Patient states that his nose started bleeding about 30 minutes pta. Patient states that he was newly placed on blood thinners (plavix, ASA). Patient states that he is unable to get his nose to start bleeding.

## 2024-11-13 ENCOUNTER — READMISSION MANAGEMENT (OUTPATIENT)
Dept: CALL CENTER | Facility: HOSPITAL | Age: 69
End: 2024-11-13
Payer: MEDICARE

## 2024-11-13 NOTE — OUTREACH NOTE
AMI Week 3 Survey      Flowsheet Row Responses   [facility] facility patient discharged from? [place]   Does the patient have one of the following disease processes/diagnoses(primary or secondary)? Acute MI (STEMI,NSTEMI)   Week 3 attempt successful? No   Unsuccessful attempts Attempt 1  [All numbers listed were attempted-no answer]            Carole H - Registered Nurse

## (undated) DEVICE — BANDAGE,GAUZE,BULKEE II,4.5"X4.1YD,STRL: Brand: MEDLINE

## (undated) DEVICE — CATH DIAG EXPO M/ PK 6FR FL4/FR4 PIG 3PK

## (undated) DEVICE — NDL SPINE 22G 31/2IN BLK

## (undated) DEVICE — CANN IRR/INJ AIR ANT CHAMBER 6MM BEND 27G

## (undated) DEVICE — SUT SILK 2/0 TIES 18IN A185H

## (undated) DEVICE — RUBBERBAND LF STRL PK/2

## (undated) DEVICE — ENCORE® LATEX MICRO SIZE 6.5, STERILE LATEX POWDER-FREE SURGICAL GLOVE: Brand: ENCORE

## (undated) DEVICE — SYS SKIN CLS DERMABOND PRINEO W/22CM MESH TP

## (undated) DEVICE — GOWN,PREVENTION PLUS,XLARGE,STERILE: Brand: MEDLINE

## (undated) DEVICE — TR BAND RADIAL ARTERY COMPRESSION DEVICE: Brand: TR BAND

## (undated) DEVICE — SLIT KNIFE FULL HDL-2.85MM ANG: Brand: SHARPOINT

## (undated) DEVICE — GUIDE CATHETER: Brand: MACH1™

## (undated) DEVICE — Device

## (undated) DEVICE — GW PERIPH GUIDERIGHT STD/EXCHNG/J/TIP SS 0.035IN 5X260CM

## (undated) DEVICE — INSTRUMENT 875-088 14MM DSTRCT PIN STRL
Type: IMPLANTABLE DEVICE | Status: NON-FUNCTIONAL
Brand: MEDTRONIC REUSABLE INSTRUMENT
Removed: 2018-05-16

## (undated) DEVICE — CATH DIAG EXPO .056 FL3.5 6F 100CM

## (undated) DEVICE — CONMED DISPOSABLE BIPOLAR CABLE, 10' (3.05M): Brand: CONMED

## (undated) DEVICE — COPILOT BLEEDBACK CONTROL VALVE: Brand: COPILOT

## (undated) DEVICE — MEDI-VAC NON-CONDUCTIVE SUCTION TUBING: Brand: CARDINAL HEALTH

## (undated) DEVICE — SYR LUERLOK 10CC

## (undated) DEVICE — NDL SPINE 20G 3 1/2 YEL STRL 1P/U

## (undated) DEVICE — DEV INFL MONARCH 25W

## (undated) DEVICE — OIL CARTRIDGE: Brand: CORE, MAESTRO

## (undated) DEVICE — MODEL AT P65, P/N 701554-001KIT CONTENTS: HAND CONTROLLER, 3-WAY HIGH-PRESSURE STOPCOCK WITH ROTATING END AND PREMIUM HIGH-PRESSURE TUBING: Brand: ANGIOTOUCH® KIT

## (undated) DEVICE — PK NEURO DISC 10

## (undated) DEVICE — SUT VIC 2/0 CT2 27IN J269H

## (undated) DEVICE — DRILL BIT 7080513 STERILE 13MM

## (undated) DEVICE — AIRWY SZ11

## (undated) DEVICE — TRY L/P SFTY A/20G

## (undated) DEVICE — SYR LL TP 10ML STRL

## (undated) DEVICE — SOL IRRIG NACL 9PCT 1000ML BTL

## (undated) DEVICE — GLV SURG SENSICARE W/ALOE PF LF 8 STRL

## (undated) DEVICE — MEDI-VAC YANKAUER SUCTION HANDLE W/BULBOUS TIP: Brand: CARDINAL HEALTH

## (undated) DEVICE — 15 DEG. MICROKNIFE - 3MM: Brand: SHARPOINT

## (undated) DEVICE — SOL IRRIG H2O 1000ML STRL

## (undated) DEVICE — TREK CORONARY DILATATION CATHETER 2.50 MM X 12 MM / RAPID-EXCHANGE: Brand: TREK

## (undated) DEVICE — SOL LR 1000ML

## (undated) DEVICE — PK CATH CARD 10

## (undated) DEVICE — 3M™ DURAPORE™ SURGICAL TAPE 1538-3, 3 INCH X 10 YARD (7,5CM X 9,1M), 4 ROLLS/BOX: Brand: 3M™ DURAPORE™

## (undated) DEVICE — ENCORE® LATEX MICRO SIZE 8, STERILE LATEX POWDER-FREE SURGICAL GLOVE: Brand: ENCORE

## (undated) DEVICE — ADULT, W/LG. BACK PAD, RADIOTRANSPARENT ELEMENT AND LEAD WIRE COMPATIBLE W/: Brand: DEFIBRILLATION ELECTRODES

## (undated) DEVICE — ELECTRD BLD EDGE/INSUL1P 2.4X5.1MM STRL

## (undated) DEVICE — PK CATARACT OPTHAMALOGY

## (undated) DEVICE — ST EXT IV SMRTSTE 2VLV FIX M LL 6ML 41

## (undated) DEVICE — MODEL BT2000 P/N 700287-012KIT CONTENTS: MANIFOLD WITH SALINE AND CONTRAST PORTS, SALINE TUBING WITH SPIKE AND HAND SYRINGE, TRANSDUCER: Brand: BT2000 AUTOMATED MANIFOLD KIT

## (undated) DEVICE — DISPOSABLE BIPOLAR FORCEPS 7 3/4" (19.7CM) SCOVILLE BAYONET, INSULATED, 1.5MM TIP AND 12 FT. (3.6M) CABLE: Brand: KIRWAN

## (undated) DEVICE — ST INF PRI SMRTSTE 20DRP 2VLV 24ML 117

## (undated) DEVICE — HP CONCL INTREPID COAX I/A CRV .3MM

## (undated) DEVICE — ENCORE® LATEX MICRO SIZE 7, STERILE LATEX POWDER-FREE SURGICAL GLOVE: Brand: ENCORE

## (undated) DEVICE — HI-TORQUE VERSATURN F GUIDE WIRE FULLY COATED .014 STRAIGHT TIP 190 CM: Brand: HI-TORQUE VERSATURN

## (undated) DEVICE — GLIDESHEATH BASIC HYDROPHILIC COATED INTRODUCER SHEATH: Brand: GLIDESHEATH

## (undated) DEVICE — 3.0MM PRECISION NEURO (MATCH HEAD)

## (undated) DEVICE — NDL SPINE 22G 5IN BLK

## (undated) DEVICE — DIFFUSER: Brand: CORE, MAESTRO

## (undated) DEVICE — KITTNER SPONGE: Brand: DEROYAL

## (undated) DEVICE — CANNULA,OXY,ADULT,SUPERSOFT,W/7'TUB,UC: Brand: MEDLINE